# Patient Record
Sex: MALE | Race: WHITE | NOT HISPANIC OR LATINO | Employment: FULL TIME | URBAN - NONMETROPOLITAN AREA
[De-identification: names, ages, dates, MRNs, and addresses within clinical notes are randomized per-mention and may not be internally consistent; named-entity substitution may affect disease eponyms.]

---

## 2020-08-07 ENCOUNTER — HOSPITAL ENCOUNTER (EMERGENCY)
Facility: HOSPITAL | Age: 25
End: 2020-08-07
Attending: EMERGENCY MEDICINE | Admitting: EMERGENCY MEDICINE
Payer: COMMERCIAL

## 2020-08-07 ENCOUNTER — APPOINTMENT (EMERGENCY)
Dept: CT IMAGING | Facility: HOSPITAL | Age: 25
End: 2020-08-07
Payer: COMMERCIAL

## 2020-08-07 ENCOUNTER — HOSPITAL ENCOUNTER (INPATIENT)
Facility: HOSPITAL | Age: 25
LOS: 5 days | Discharge: HOME | DRG: 419 | End: 2020-08-12
Attending: INTERNAL MEDICINE | Admitting: INTERNAL MEDICINE
Payer: COMMERCIAL

## 2020-08-07 VITALS
RESPIRATION RATE: 18 BRPM | DIASTOLIC BLOOD PRESSURE: 67 MMHG | OXYGEN SATURATION: 99 % | BODY MASS INDEX: 31.89 KG/M2 | WEIGHT: 235.45 LBS | HEART RATE: 71 BPM | TEMPERATURE: 97.9 F | SYSTOLIC BLOOD PRESSURE: 121 MMHG | HEIGHT: 72 IN

## 2020-08-07 DIAGNOSIS — K80.51: Primary | ICD-10-CM

## 2020-08-07 DIAGNOSIS — R17 JAUNDICE: ICD-10-CM

## 2020-08-07 DIAGNOSIS — R17 ELEVATED BILIRUBIN: ICD-10-CM

## 2020-08-07 DIAGNOSIS — R79.89 ELEVATED LFTS: ICD-10-CM

## 2020-08-07 PROBLEM — Z72.0 TOBACCO ABUSE: Status: ACTIVE | Noted: 2020-08-07

## 2020-08-07 LAB
ALBUMIN SERPL BCP-MCNC: 4.3 G/DL (ref 3.5–5)
ALP SERPL-CCNC: 188 U/L (ref 46–116)
ALT SERPL W P-5'-P-CCNC: 545 U/L (ref 12–78)
ANION GAP SERPL CALCULATED.3IONS-SCNC: 9 MMOL/L (ref 4–13)
APTT PPP: 27 SECONDS (ref 23–37)
AST SERPL W P-5'-P-CCNC: 309 U/L (ref 5–45)
BACTERIA UR QL AUTO: ABNORMAL /HPF
BASOPHILS # BLD AUTO: 0.03 THOUSANDS/ΜL (ref 0–0.1)
BASOPHILS NFR BLD AUTO: 0 % (ref 0–1)
BILIRUB SERPL-MCNC: 4.98 MG/DL (ref 0.2–1)
BILIRUB UR QL STRIP: ABNORMAL
BUN SERPL-MCNC: 10 MG/DL (ref 5–25)
CALCIUM SERPL-MCNC: 9.4 MG/DL (ref 8.3–10.1)
CHLORIDE SERPL-SCNC: 101 MMOL/L (ref 100–108)
CK SERPL-CCNC: 75 U/L (ref 39–308)
CLARITY UR: CLEAR
CO2 SERPL-SCNC: 30 MMOL/L (ref 21–32)
COLOR UR: ABNORMAL
CREAT SERPL-MCNC: 1.13 MG/DL (ref 0.6–1.3)
EOSINOPHIL # BLD AUTO: 0.08 THOUSAND/ΜL (ref 0–0.61)
EOSINOPHIL NFR BLD AUTO: 1 % (ref 0–6)
ERYTHROCYTE [DISTWIDTH] IN BLOOD BY AUTOMATED COUNT: 12.5 % (ref 11.6–15.1)
GFR SERPL CREATININE-BSD FRML MDRD: 90 ML/MIN/1.73SQ M
GLUCOSE SERPL-MCNC: 109 MG/DL (ref 65–140)
GLUCOSE UR STRIP-MCNC: NEGATIVE MG/DL
HCT VFR BLD AUTO: 48.4 % (ref 36.5–49.3)
HGB BLD-MCNC: 16.9 G/DL (ref 12–17)
HGB UR QL STRIP.AUTO: NEGATIVE
HYALINE CASTS #/AREA URNS LPF: ABNORMAL /LPF
IMM GRANULOCYTES # BLD AUTO: 0.05 THOUSAND/UL (ref 0–0.2)
IMM GRANULOCYTES NFR BLD AUTO: 0 % (ref 0–2)
INR PPP: 0.96 (ref 0.84–1.19)
KETONES UR STRIP-MCNC: ABNORMAL MG/DL
LEUKOCYTE ESTERASE UR QL STRIP: NEGATIVE
LIPASE SERPL-CCNC: 183 U/L (ref 73–393)
LYMPHOCYTES # BLD AUTO: 1.74 THOUSANDS/ΜL (ref 0.6–4.47)
LYMPHOCYTES NFR BLD AUTO: 16 % (ref 14–44)
MCH RBC QN AUTO: 28.4 PG (ref 26.8–34.3)
MCHC RBC AUTO-ENTMCNC: 34.9 G/DL (ref 31.4–37.4)
MCV RBC AUTO: 81 FL (ref 82–98)
MONOCYTES # BLD AUTO: 0.62 THOUSAND/ΜL (ref 0.17–1.22)
MONOCYTES NFR BLD AUTO: 6 % (ref 4–12)
MUCOUS THREADS UR QL AUTO: ABNORMAL
NEUTROPHILS # BLD AUTO: 8.61 THOUSANDS/ΜL (ref 1.85–7.62)
NEUTS SEG NFR BLD AUTO: 77 % (ref 43–75)
NITRITE UR QL STRIP: NEGATIVE
NON-SQ EPI CELLS URNS QL MICRO: ABNORMAL /HPF
NRBC BLD AUTO-RTO: 0 /100 WBCS
PH UR STRIP.AUTO: 8.5 [PH]
PLATELET # BLD AUTO: 233 THOUSANDS/UL (ref 149–390)
PMV BLD AUTO: 9.1 FL (ref 8.9–12.7)
POTASSIUM SERPL-SCNC: 4.1 MMOL/L (ref 3.5–5.3)
PROT SERPL-MCNC: 7.6 G/DL (ref 6.4–8.2)
PROT UR STRIP-MCNC: ABNORMAL MG/DL
PROTHROMBIN TIME: 12.6 SECONDS (ref 11.6–14.5)
RBC # BLD AUTO: 5.96 MILLION/UL (ref 3.88–5.62)
RBC #/AREA URNS AUTO: ABNORMAL /HPF
SODIUM SERPL-SCNC: 140 MMOL/L (ref 136–145)
SP GR UR STRIP.AUTO: 1.01 (ref 1–1.03)
UROBILINOGEN UR QL STRIP.AUTO: 1 E.U./DL
WBC # BLD AUTO: 11.13 THOUSAND/UL (ref 4.31–10.16)
WBC #/AREA URNS AUTO: ABNORMAL /HPF

## 2020-08-07 PROCEDURE — 84443 ASSAY THYROID STIM HORMONE: CPT | Performed by: STUDENT IN AN ORGANIZED HEALTH CARE EDUCATION/TRAINING PROGRAM

## 2020-08-07 PROCEDURE — 85025 COMPLETE CBC W/AUTO DIFF WBC: CPT | Performed by: EMERGENCY MEDICINE

## 2020-08-07 PROCEDURE — 82550 ASSAY OF CK (CPK): CPT | Performed by: EMERGENCY MEDICINE

## 2020-08-07 PROCEDURE — 83690 ASSAY OF LIPASE: CPT | Performed by: EMERGENCY MEDICINE

## 2020-08-07 PROCEDURE — 99285 EMERGENCY DEPT VISIT HI MDM: CPT | Performed by: EMERGENCY MEDICINE

## 2020-08-07 PROCEDURE — 36415 COLL VENOUS BLD VENIPUNCTURE: CPT | Performed by: EMERGENCY MEDICINE

## 2020-08-07 PROCEDURE — G1004 CDSM NDSC: HCPCS

## 2020-08-07 PROCEDURE — 80053 COMPREHEN METABOLIC PANEL: CPT | Performed by: EMERGENCY MEDICINE

## 2020-08-07 PROCEDURE — 74176 CT ABD & PELVIS W/O CONTRAST: CPT

## 2020-08-07 PROCEDURE — 87040 BLOOD CULTURE FOR BACTERIA: CPT | Performed by: EMERGENCY MEDICINE

## 2020-08-07 PROCEDURE — 80061 LIPID PANEL: CPT | Performed by: STUDENT IN AN ORGANIZED HEALTH CARE EDUCATION/TRAINING PROGRAM

## 2020-08-07 PROCEDURE — 83036 HEMOGLOBIN GLYCOSYLATED A1C: CPT | Performed by: STUDENT IN AN ORGANIZED HEALTH CARE EDUCATION/TRAINING PROGRAM

## 2020-08-07 PROCEDURE — 96365 THER/PROPH/DIAG IV INF INIT: CPT

## 2020-08-07 PROCEDURE — 99285 EMERGENCY DEPT VISIT HI MDM: CPT

## 2020-08-07 PROCEDURE — 85730 THROMBOPLASTIN TIME PARTIAL: CPT | Performed by: EMERGENCY MEDICINE

## 2020-08-07 PROCEDURE — 85610 PROTHROMBIN TIME: CPT | Performed by: EMERGENCY MEDICINE

## 2020-08-07 PROCEDURE — 96375 TX/PRO/DX INJ NEW DRUG ADDON: CPT

## 2020-08-07 PROCEDURE — 81001 URINALYSIS AUTO W/SCOPE: CPT | Performed by: EMERGENCY MEDICINE

## 2020-08-07 PROCEDURE — 96361 HYDRATE IV INFUSION ADD-ON: CPT

## 2020-08-07 RX ORDER — KETOROLAC TROMETHAMINE 30 MG/ML
30 INJECTION, SOLUTION INTRAMUSCULAR; INTRAVENOUS EVERY 6 HOURS PRN
Status: DISCONTINUED | OUTPATIENT
Start: 2020-08-07 | End: 2020-08-08

## 2020-08-07 RX ORDER — KETOROLAC TROMETHAMINE 30 MG/ML
30 INJECTION, SOLUTION INTRAMUSCULAR; INTRAVENOUS ONCE
Status: COMPLETED | OUTPATIENT
Start: 2020-08-07 | End: 2020-08-07

## 2020-08-07 RX ORDER — ONDANSETRON 2 MG/ML
4 INJECTION INTRAMUSCULAR; INTRAVENOUS EVERY 6 HOURS PRN
Status: DISCONTINUED | OUTPATIENT
Start: 2020-08-07 | End: 2020-08-12 | Stop reason: HOSPADM

## 2020-08-07 RX ORDER — CEFTRIAXONE 1 G/50ML
1000 INJECTION, SOLUTION INTRAVENOUS ONCE
Status: COMPLETED | OUTPATIENT
Start: 2020-08-07 | End: 2020-08-07

## 2020-08-07 RX ORDER — ONDANSETRON 2 MG/ML
4 INJECTION INTRAMUSCULAR; INTRAVENOUS ONCE
Status: COMPLETED | OUTPATIENT
Start: 2020-08-07 | End: 2020-08-07

## 2020-08-07 RX ORDER — ACETAMINOPHEN 325 MG/1
975 TABLET ORAL EVERY 6 HOURS PRN
Status: DISCONTINUED | OUTPATIENT
Start: 2020-08-07 | End: 2020-08-12 | Stop reason: HOSPADM

## 2020-08-07 RX ORDER — SODIUM CHLORIDE 9 MG/ML
100 INJECTION, SOLUTION INTRAVENOUS CONTINUOUS
Status: DISCONTINUED | OUTPATIENT
Start: 2020-08-07 | End: 2020-08-08

## 2020-08-07 RX ORDER — KETOROLAC TROMETHAMINE 30 MG/ML
15 INJECTION, SOLUTION INTRAMUSCULAR; INTRAVENOUS ONCE
Status: COMPLETED | OUTPATIENT
Start: 2020-08-07 | End: 2020-08-07

## 2020-08-07 RX ADMIN — KETOROLAC TROMETHAMINE 15 MG: 30 INJECTION, SOLUTION INTRAMUSCULAR at 23:21

## 2020-08-07 RX ADMIN — SODIUM CHLORIDE 100 ML/HR: 0.9 INJECTION, SOLUTION INTRAVENOUS at 22:45

## 2020-08-07 RX ADMIN — ONDANSETRON 4 MG: 2 INJECTION INTRAMUSCULAR; INTRAVENOUS at 19:17

## 2020-08-07 RX ADMIN — SODIUM CHLORIDE 1000 ML: 0.9 INJECTION, SOLUTION INTRAVENOUS at 19:15

## 2020-08-07 RX ADMIN — KETOROLAC TROMETHAMINE 30 MG: 30 INJECTION, SOLUTION INTRAMUSCULAR at 19:17

## 2020-08-07 RX ADMIN — CEFTRIAXONE 1000 MG: 1 INJECTION, SOLUTION INTRAVENOUS at 20:28

## 2020-08-07 NOTE — LETTER
[address]  [address] [address]  Dept: [phone]    August 12, 2020     Patient: Julee Khan   YOB: 1995   Date of Visit: 8/7/2020       To Whom it May Concern:    Lamar Stout is under my professional care  He was seen in the hospital from 8/7/2020   to 08/12/20  He may return to work on 8/17/2020 with the following limitations avoid heavy lifting and increased exertion        If you have any questions or concerns, please don't hesitate to call           Sincerely,          Jodie Teixeira, DO

## 2020-08-07 NOTE — ED TRIAGE NOTES
Patient states pain comes and goes in his back  Drank pedialyte today and was vomiting after   States everything he drinks and eats he ends up vomiting back up

## 2020-08-07 NOTE — ED PROVIDER NOTES
History  Chief Complaint   Patient presents with    Back Pain     patient states he has lower back pain that started months ago when he was picking up a swing set  states he wakes up in pain  c/o dark colored urine as well     Patient is a 78-year-old male with no significant past medical or surgical history presents the emergency department complaining of lower back pain started about a month ago has been intermittent and worsening wheeze he initially injured his lower back when picking up a swing set about a month ago  Over the past 2 days the patient also noticed worsening back pain worse when he wakes up in the morning and dark coloration to the urine pain does not radiate up into the flanks no dysuria or burning with urination  Patient has also had associated nausea and vomiting no diarrhea no fevers or chills  No numbness or weakness in the legs  History provided by:  Patient and friend  Back Pain   Location:  Lumbar spine  Quality:  Aching and cramping  Chronicity:  Chronic  Associated symptoms: no abdominal pain, no chest pain, no dysuria, no fever, no headaches, no numbness and no weakness        None       History reviewed  No pertinent past medical history  History reviewed  No pertinent surgical history  History reviewed  No pertinent family history  I have reviewed and agree with the history as documented  E-Cigarette/Vaping    E-Cigarette Use Never User      E-Cigarette/Vaping Substances    Nicotine No     THC No     CBD No     Flavoring No     Other No     Unknown No      Social History     Tobacco Use    Smoking status: Current Every Day Smoker     Packs/day: 1 50     Types: Cigarettes    Smokeless tobacco: Never Used   Substance Use Topics    Alcohol use: Not Currently    Drug use: Never       Review of Systems   Constitutional: Negative for activity change, appetite change, chills, fatigue and fever     HENT: Negative for congestion, ear pain, rhinorrhea and sore throat  Eyes: Negative for discharge, redness and visual disturbance  Respiratory: Negative for cough, chest tightness, shortness of breath and wheezing  Cardiovascular: Negative for chest pain and palpitations  Gastrointestinal: Positive for nausea and vomiting  Negative for abdominal pain, constipation and diarrhea  Endocrine: Negative for polydipsia and polyuria  Genitourinary: Negative for difficulty urinating, dysuria, frequency, hematuria and urgency  Dark colored urine   Musculoskeletal: Positive for back pain  Negative for arthralgias and myalgias  Skin: Negative for color change, pallor and rash  Neurological: Negative for dizziness, weakness, light-headedness, numbness and headaches  Hematological: Negative for adenopathy  Does not bruise/bleed easily  All other systems reviewed and are negative  Physical Exam  Physical Exam  Vitals signs and nursing note reviewed  Constitutional:       Appearance: He is well-developed  HENT:      Head: Normocephalic and atraumatic  Right Ear: External ear normal       Left Ear: External ear normal       Nose: Nose normal    Eyes:      Conjunctiva/sclera: Conjunctivae normal       Pupils: Pupils are equal, round, and reactive to light  Neck:      Musculoskeletal: Normal range of motion and neck supple  Cardiovascular:      Rate and Rhythm: Normal rate and regular rhythm  Heart sounds: Normal heart sounds  Pulmonary:      Effort: Pulmonary effort is normal  No respiratory distress  Breath sounds: Normal breath sounds  No wheezing or rales  Chest:      Chest wall: No tenderness  Abdominal:      General: Bowel sounds are normal  There is no distension  Palpations: Abdomen is soft  Tenderness: There is no abdominal tenderness  There is no guarding  Musculoskeletal: Normal range of motion  Lumbar back: He exhibits tenderness, pain and spasm  Skin:     General: Skin is warm and dry     Neurological: Mental Status: He is alert and oriented to person, place, and time  Cranial Nerves: No cranial nerve deficit  Sensory: No sensory deficit           Vital Signs  ED Triage Vitals [08/07/20 1854]   Temperature Pulse Respirations Blood Pressure SpO2   (!) 97 °F (36 1 °C) 98 18 136/83 99 %      Temp Source Heart Rate Source Patient Position - Orthostatic VS BP Location FiO2 (%)   Temporal Monitor Lying Left arm --      Pain Score       8           Vitals:    08/07/20 1854 08/07/20 1930   BP: 136/83 132/78   Pulse: 98 104   Patient Position - Orthostatic VS: Lying Lying         Visual Acuity      ED Medications  Medications   cefTRIAXone (ROCEPHIN) IVPB (premix) 1,000 mg 50 mL (1,000 mg Intravenous New Bag 8/7/20 2028)   sodium chloride 0 9 % bolus 1,000 mL (1,000 mL Intravenous New Bag 8/7/20 1915)   ondansetron (ZOFRAN) injection 4 mg (4 mg Intravenous Given 8/7/20 1917)   ketorolac (TORADOL) injection 30 mg (30 mg Intravenous Given 8/7/20 1917)       Diagnostic Studies  Results Reviewed     Procedure Component Value Units Date/Time    Blood culture #1 [[id number]] Collected:  08/07/20 2027    Lab Status:  No result Specimen:  Blood from Hand, Right     Blood culture #2 [[id number]] Collected:  08/07/20 2026    Lab Status:  No result Specimen:  Blood from Arm, Left     Comprehensive metabolic panel [[id number]]  (Abnormal) Collected:  08/07/20 1915    Lab Status:  Final result Specimen:  Blood from Arm, Right Updated:  08/07/20 1953     Sodium 140 mmol/L      Potassium 4 1 mmol/L      Chloride 101 mmol/L      CO2 30 mmol/L      ANION GAP 9 mmol/L      BUN 10 mg/dL      Creatinine 1 13 mg/dL      Glucose 109 mg/dL      Calcium 9 4 mg/dL       U/L       U/L      Alkaline Phosphatase 188 U/L      Total Protein 7 6 g/dL      Albumin 4 3 g/dL      Total Bilirubin 4 98 mg/dL      eGFR 90 ml/min/1 73sq m     Narrative:       [place] guidelines for Chronic Kidney Disease (CKD):      Stage 1 with normal or high GFR (GFR > 90 mL/min/1 73 square meters)    Stage 2 Mild CKD (GFR = 60-89 mL/min/1 73 square meters)    Stage 3A Moderate CKD (GFR = 45-59 mL/min/1 73 square meters)    Stage 3B Moderate CKD (GFR = 30-44 mL/min/1 73 square meters)    Stage 4 Severe CKD (GFR = 15-29 mL/min/1 73 square meters)    Stage 5 End Stage CKD (GFR <15 mL/min/1 73 square meters)  Note: GFR calculation is accurate only with a steady state creatinine    Lipase [[id number]]  (Normal) Collected:  08/07/20 1915    Lab Status:  Final result Specimen:  Blood from Arm, Right Updated:  08/07/20 1953     Lipase 183 u/L     CK Total with Reflex CKMB [[id number]]  (Normal) Collected:  08/07/20 1915    Lab Status:  Final result Specimen:  Blood from Arm, Right Updated:  08/07/20 1952     Total CK 75 U/L     Protime-INR [[id number]]  (Normal) Collected:  08/07/20 1915    Lab Status:  Final result Specimen:  Blood from Arm, Right Updated:  08/07/20 1950     Protime 12 6 seconds      INR 0 96    APTT [[id number]]  (Normal) Collected:  08/07/20 1915    Lab Status:  Final result Specimen:  Blood from Arm, Right Updated:  08/07/20 1950     PTT 27 seconds     Urine Microscopic [[id number]]  (Abnormal) Collected:  08/07/20 1919    Lab Status:  Final result Specimen:  Urine, Clean Catch Updated:  08/07/20 1946     RBC, UA 0-1 /hpf      WBC, UA 0-1 /hpf      Epithelial Cells Occasional /hpf      Bacteria, UA Moderate /hpf      Hyaline Casts, UA 0-1 /lpf      MUCUS THREADS Occasional    UA w Reflex to Microscopic w Reflex to Culture [[id number]]  (Abnormal) Collected:  08/07/20 1919    Lab Status:  Final result Specimen:  Urine, Clean Catch Updated:  08/07/20 1929     Color, UA Izabela     Clarity, UA Clear     Specific Gravity, UA 1 015     pH, UA 8 5     Leukocytes, UA Negative     Nitrite, UA Negative     Protein, UA 30 (1+) mg/dl      Glucose, UA Negative mg/dl      Ketones, UA 15 (1+) mg/dl      Urobilinogen, UA 1 0 E U /dl Bilirubin, UA Large     Blood, UA Negative    CBC and differential [[id number]]  (Abnormal) Collected:  08/07/20 1915    Lab Status:  Final result Specimen:  Blood from Arm, Right Updated:  08/07/20 1925     WBC 11 13 Thousand/uL      RBC 5 96 Million/uL      Hemoglobin 16 9 g/dL      Hematocrit 48 4 %      MCV 81 fL      MCH 28 4 pg      MCHC 34 9 g/dL      RDW 12 5 %      MPV 9 1 fL      Platelets 318 Thousands/uL      nRBC 0 /100 WBCs      Neutrophils Relative 77 %      Immat GRANS % 0 %      Lymphocytes Relative 16 %      Monocytes Relative 6 %      Eosinophils Relative 1 %      Basophils Relative 0 %      Neutrophils Absolute 8 61 Thousands/µL      Immature Grans Absolute 0 05 Thousand/uL      Lymphocytes Absolute 1 74 Thousands/µL      Monocytes Absolute 0 62 Thousand/µL      Eosinophils Absolute 0 08 Thousand/µL      Basophils Absolute 0 03 Thousands/µL                  CT renal stone study abdomen pelvis wo contrast   Final Result by Jack Morrison MD (08/07 1940)         1  Single gallstone in the gallbladder neck without evidence of acute cholecystitis  2  Two calculi in the distal common bile duct within the head of the pancreas measuring 3 and 6 mm  Minimal dilatation of the proximal common bile duct without intrahepatic bile duct dilatation  GI consultation, ERCP and/or MRCP may be helpful for    further evaluation  3   No evidence of nephrolithiasis or obstructive uropathy  Workstation performed: [id number]                    Procedures  Procedures         ED Course  ED Course as of Aug 07 2030   Fri Aug 07, 2020   2003 Spoke with Dr Shelbie Gutierrez gastroenterology on-call reviewed case and findings in the emergency department he recommends transfer for ERCP and obtaining blood cultures and treating with IV antibiotics now          2012 Spoke with Dr Zack Camara on-call for GI reviewed case and findings in the emergency department he agrees with plan and management thus far recommends transfer for ERCP on Monday 2024 Spoke with Sharlene MERCADO internal medicine reviewed case and findings in the emergency department and GI recommendations and management thus far she accepts for transfer on behalf of Dr Irma Harris  US AUDIT      Most Recent Value   Initial Alcohol Screen: US AUDIT-C    1  How often do you have a drink containing alcohol?  0 Filed at: 08/07/2020 1857   2  How many drinks containing alcohol do you have on a typical day you are drinking? 0 Filed at: 08/07/2020 1857   3a  Male UNDER 65: How often do you have five or more drinks on one occasion? 0 Filed at: 08/07/2020 1857   3b  FEMALE Any Age, or MALE 65+: How often do you have 4 or more drinks on one occassion? 0 Filed at: 08/07/2020 1857   Audit-C Score  0 Filed at: 08/07/2020 1857                  LIBERTAD/DAST-10      Most Recent Value   How many times in the past year have you    Used an illegal drug or used a prescription medication for non-medical reasons?   Never Filed at: 08/07/2020 1921                                MDM  Number of Diagnoses or Management Options  Calculus of common bile duct with obstruction: new and requires workup  Elevated bilirubin: new and requires workup  Elevated LFTs: new and requires workup  Jaundice: new and requires workup     Amount and/or Complexity of Data Reviewed  Clinical lab tests: ordered and reviewed  Tests in the radiology section of CPT®: ordered and reviewed  Tests in the medicine section of CPT®: ordered and reviewed  Decide to obtain previous medical records or to obtain history from someone other than the patient: yes  Review and summarize past medical records: yes  Independent visualization of images, tracings, or specimens: yes    Risk of Complications, Morbidity, and/or Mortality  Presenting problems: moderate  Diagnostic procedures: moderate  Management options: moderate    Patient Progress  Patient progress: stable        Disposition  Final diagnoses:   Calculus of common bile duct with obstruction   Elevated bilirubin   Elevated LFTs   Jaundice     Time reflects when diagnosis was documented in both MDM as applicable and the Disposition within this note     Time User Action Codes Description Comment    8/7/2020  7:55 PM Ova Tyler Add [K80 51] Calculus of common bile duct with obstruction     8/7/2020  7:56 PM Ova Tyler Add [R17] Elevated bilirubin     8/7/2020  7:56 PM Ova Tyler Add [R79 89] Elevated LFTs     8/7/2020  8:04 PM Ova Tyler Add [R17] Jaundice       ED Disposition     ED Disposition Condition Date/Time Comment    Transfer to Another Facility-In Network  Fri Aug 7, 2020  8:19 PM Cyrena Masker Sites should be transferred out to B  Follow-up Information    None         Patient's Medications    No medications on file     No discharge procedures on file      PDMP Review     None          ED Provider  Electronically Signed by           Arnav Plata DO  08/07/20 2030

## 2020-08-08 PROBLEM — R71.8 MICROCYTOSIS: Status: ACTIVE | Noted: 2020-08-08

## 2020-08-08 PROBLEM — E66.9 OBESITY (BMI 30.0-34.9): Status: ACTIVE | Noted: 2020-08-08

## 2020-08-08 PROBLEM — E80.6 HYPERBILIRUBINEMIA: Status: ACTIVE | Noted: 2020-08-08

## 2020-08-08 PROBLEM — R74.01 TRANSAMINITIS: Status: ACTIVE | Noted: 2020-08-08

## 2020-08-08 PROBLEM — D72.829 LEUKOCYTOSIS: Status: ACTIVE | Noted: 2020-08-08

## 2020-08-08 LAB
ALBUMIN SERPL BCP-MCNC: 3.5 G/DL (ref 3.5–5)
ALP SERPL-CCNC: 173 U/L (ref 46–116)
ALT SERPL W P-5'-P-CCNC: 422 U/L (ref 12–78)
ANION GAP SERPL CALCULATED.3IONS-SCNC: 7 MMOL/L (ref 4–13)
AST SERPL W P-5'-P-CCNC: 219 U/L (ref 5–45)
BILIRUB SERPL-MCNC: 4.26 MG/DL (ref 0.2–1)
BUN SERPL-MCNC: 10 MG/DL (ref 5–25)
CALCIUM SERPL-MCNC: 8.8 MG/DL (ref 8.3–10.1)
CHLORIDE SERPL-SCNC: 106 MMOL/L (ref 100–108)
CHOLEST SERPL-MCNC: 198 MG/DL (ref 50–200)
CO2 SERPL-SCNC: 27 MMOL/L (ref 21–32)
CREAT SERPL-MCNC: 0.94 MG/DL (ref 0.6–1.3)
ERYTHROCYTE [DISTWIDTH] IN BLOOD BY AUTOMATED COUNT: 12.7 % (ref 11.6–15.1)
EST. AVERAGE GLUCOSE BLD GHB EST-MCNC: 114 MG/DL
GFR SERPL CREATININE-BSD FRML MDRD: 112 ML/MIN/1.73SQ M
GLUCOSE SERPL-MCNC: 112 MG/DL (ref 65–140)
HBA1C MFR BLD: 5.6 %
HBV CORE AB SER QL: NORMAL
HBV CORE IGM SER QL: NORMAL
HBV SURFACE AG SER QL: NORMAL
HCT VFR BLD AUTO: 46.7 % (ref 36.5–49.3)
HCV AB SER QL: NORMAL
HDLC SERPL-MCNC: 20 MG/DL
HGB BLD-MCNC: 15.7 G/DL (ref 12–17)
HIV 1+2 AB+HIV1 P24 AG SERPL QL IA: NORMAL
HIV1 P24 AG SER QL: NORMAL
LDLC SERPL CALC-MCNC: 123 MG/DL (ref 0–100)
MCH RBC QN AUTO: 28.4 PG (ref 26.8–34.3)
MCHC RBC AUTO-ENTMCNC: 33.6 G/DL (ref 31.4–37.4)
MCV RBC AUTO: 84 FL (ref 82–98)
PLATELET # BLD AUTO: 204 THOUSANDS/UL (ref 149–390)
PMV BLD AUTO: 9.6 FL (ref 8.9–12.7)
POTASSIUM SERPL-SCNC: 4.2 MMOL/L (ref 3.5–5.3)
PROT SERPL-MCNC: 6.6 G/DL (ref 6.4–8.2)
RBC # BLD AUTO: 5.53 MILLION/UL (ref 3.88–5.62)
SODIUM SERPL-SCNC: 140 MMOL/L (ref 136–145)
TRIGL SERPL-MCNC: 273 MG/DL
TSH SERPL DL<=0.05 MIU/L-ACNC: 0.98 UIU/ML (ref 0.36–3.74)
WBC # BLD AUTO: 10.12 THOUSAND/UL (ref 4.31–10.16)

## 2020-08-08 PROCEDURE — 86705 HEP B CORE ANTIBODY IGM: CPT | Performed by: STUDENT IN AN ORGANIZED HEALTH CARE EDUCATION/TRAINING PROGRAM

## 2020-08-08 PROCEDURE — 99254 IP/OBS CNSLTJ NEW/EST MOD 60: CPT | Performed by: SURGERY

## 2020-08-08 PROCEDURE — 85027 COMPLETE CBC AUTOMATED: CPT | Performed by: STUDENT IN AN ORGANIZED HEALTH CARE EDUCATION/TRAINING PROGRAM

## 2020-08-08 PROCEDURE — 87340 HEPATITIS B SURFACE AG IA: CPT | Performed by: STUDENT IN AN ORGANIZED HEALTH CARE EDUCATION/TRAINING PROGRAM

## 2020-08-08 PROCEDURE — NC001 PR NO CHARGE: Performed by: INTERNAL MEDICINE

## 2020-08-08 PROCEDURE — 86704 HEP B CORE ANTIBODY TOTAL: CPT | Performed by: STUDENT IN AN ORGANIZED HEALTH CARE EDUCATION/TRAINING PROGRAM

## 2020-08-08 PROCEDURE — 99222 1ST HOSP IP/OBS MODERATE 55: CPT | Performed by: INTERNAL MEDICINE

## 2020-08-08 PROCEDURE — 87806 HIV AG W/HIV1&2 ANTB W/OPTIC: CPT | Performed by: STUDENT IN AN ORGANIZED HEALTH CARE EDUCATION/TRAINING PROGRAM

## 2020-08-08 PROCEDURE — 86803 HEPATITIS C AB TEST: CPT | Performed by: STUDENT IN AN ORGANIZED HEALTH CARE EDUCATION/TRAINING PROGRAM

## 2020-08-08 PROCEDURE — 80053 COMPREHEN METABOLIC PANEL: CPT | Performed by: STUDENT IN AN ORGANIZED HEALTH CARE EDUCATION/TRAINING PROGRAM

## 2020-08-08 RX ORDER — LANOLIN ALCOHOL/MO/W.PET/CERES
3 CREAM (GRAM) TOPICAL
Status: DISCONTINUED | OUTPATIENT
Start: 2020-08-08 | End: 2020-08-12 | Stop reason: HOSPADM

## 2020-08-08 RX ORDER — OXYCODONE HYDROCHLORIDE 10 MG/1
10 TABLET ORAL EVERY 4 HOURS PRN
Status: DISCONTINUED | OUTPATIENT
Start: 2020-08-08 | End: 2020-08-12 | Stop reason: HOSPADM

## 2020-08-08 RX ORDER — OXYCODONE HYDROCHLORIDE 5 MG/1
5 TABLET ORAL EVERY 4 HOURS PRN
Status: DISCONTINUED | OUTPATIENT
Start: 2020-08-08 | End: 2020-08-12 | Stop reason: HOSPADM

## 2020-08-08 RX ORDER — DIPHENHYDRAMINE HCL 25 MG
12.5 TABLET ORAL EVERY 6 HOURS PRN
Status: DISCONTINUED | OUTPATIENT
Start: 2020-08-08 | End: 2020-08-08

## 2020-08-08 RX ORDER — SODIUM CHLORIDE, SODIUM GLUCONATE, SODIUM ACETATE, POTASSIUM CHLORIDE, MAGNESIUM CHLORIDE, SODIUM PHOSPHATE, DIBASIC, AND POTASSIUM PHOSPHATE .53; .5; .37; .037; .03; .012; .00082 G/100ML; G/100ML; G/100ML; G/100ML; G/100ML; G/100ML; G/100ML
100 INJECTION, SOLUTION INTRAVENOUS CONTINUOUS
Status: DISPENSED | OUTPATIENT
Start: 2020-08-08 | End: 2020-08-11

## 2020-08-08 RX ORDER — HYDROMORPHONE HCL/PF 1 MG/ML
1 SYRINGE (ML) INJECTION
Status: DISCONTINUED | OUTPATIENT
Start: 2020-08-08 | End: 2020-08-09

## 2020-08-08 RX ORDER — DIPHENHYDRAMINE HCL 25 MG
12.5 TABLET ORAL
Status: DISCONTINUED | OUTPATIENT
Start: 2020-08-08 | End: 2020-08-12 | Stop reason: HOSPADM

## 2020-08-08 RX ADMIN — HYDROMORPHONE HYDROCHLORIDE 1 MG: 1 INJECTION, SOLUTION INTRAMUSCULAR; INTRAVENOUS; SUBCUTANEOUS at 19:16

## 2020-08-08 RX ADMIN — METRONIDAZOLE 500 MG: 500 INJECTION, SOLUTION INTRAVENOUS at 03:35

## 2020-08-08 RX ADMIN — SODIUM CHLORIDE, SODIUM GLUCONATE, SODIUM ACETATE, POTASSIUM CHLORIDE AND MAGNESIUM CHLORIDE 100 ML/HR: 526; 502; 368; 37; 30 INJECTION, SOLUTION INTRAVENOUS at 13:36

## 2020-08-08 RX ADMIN — OXYCODONE HYDROCHLORIDE 10 MG: 10 TABLET ORAL at 12:26

## 2020-08-08 RX ADMIN — METRONIDAZOLE 500 MG: 500 INJECTION, SOLUTION INTRAVENOUS at 19:16

## 2020-08-08 RX ADMIN — HYDROMORPHONE HYDROCHLORIDE 1 MG: 1 INJECTION, SOLUTION INTRAMUSCULAR; INTRAVENOUS; SUBCUTANEOUS at 13:36

## 2020-08-08 RX ADMIN — SODIUM CHLORIDE, SODIUM GLUCONATE, SODIUM ACETATE, POTASSIUM CHLORIDE AND MAGNESIUM CHLORIDE 100 ML/HR: 526; 502; 368; 37; 30 INJECTION, SOLUTION INTRAVENOUS at 03:30

## 2020-08-08 RX ADMIN — DIPHENHYDRAMINE HCL 12.5 MG: 25 TABLET ORAL at 00:20

## 2020-08-08 RX ADMIN — CEFTRIAXONE SODIUM 2000 MG: 2 INJECTION, POWDER, FOR SOLUTION INTRAMUSCULAR; INTRAVENOUS at 05:38

## 2020-08-08 RX ADMIN — ONDANSETRON 4 MG: 2 INJECTION INTRAMUSCULAR; INTRAVENOUS at 13:36

## 2020-08-08 RX ADMIN — ONDANSETRON 4 MG: 2 INJECTION INTRAMUSCULAR; INTRAVENOUS at 00:36

## 2020-08-08 RX ADMIN — ONDANSETRON 4 MG: 2 INJECTION INTRAMUSCULAR; INTRAVENOUS at 19:16

## 2020-08-08 RX ADMIN — MELATONIN 3 MG: at 00:20

## 2020-08-08 RX ADMIN — METRONIDAZOLE 500 MG: 500 INJECTION, SOLUTION INTRAVENOUS at 11:36

## 2020-08-08 NOTE — PROGRESS NOTES
INTERNAL MEDICINE RESIDENCY PROGRESS NOTE     Name: Irina Devries [facility]   Age & Sex: 22 y o  male   MRN: [id number]  Unit/Bed#: -01   Encounter: [id number]  Team: SOD Team A    PATIENT INFORMATION     Name: Irina Devries [facility]   Age & Sex: 22 y o  male   MRN: [id number]  Hospital Stay Days: 1    ASSESSMENT/PLAN     Principal Problem:    Calculus of common bile duct with obstruction  Active Problems:    Tobacco abuse    Obesity (BMI 30 0-34  9)    Transaminitis    Leukocytosis    Hyperbilirubinemia    Microcytosis      Hyperbilirubinemia  Assessment & Plan  Tbili 4 98, jaundice on arrival    Plan:  -continue to trend    Leukocytosis  Assessment & Plan  Mild leukocytosis on arrival, WBC 11 13    Plan:  -f/u am CBC    Transaminitis  Assessment & Plan    on arrival     Plan:  -likely 2/2 cholestasis  -Trend CMP, HIV negative  Hep panel pending    Obesity (BMI 30 0-34  9)  Assessment & Plan  -educated on lifestyle modification/weight loss  -f/u A1c, lipid panel, TSH    Tobacco abuse  Assessment & Plan  Current smoker, 1 5 ppd  Plan:  -smoking cessation counseling, nicotine patch deferred    * Calculus of common bile duct with obstruction  Assessment & Plan  1 month hx of LBP that has become constant in past 1-2 days, now with worsening RUQ/epigastric pain w/ dark urine/jaundice/NBNB emesis  AVSS on arrival  , , Alk-phos 188, Tbili 4 98, WBC 11 13  CT in ED demonstrated stone in gallbladder neck w/o acute cholecystitis, 2 calculi in distal CBD within head of pancreas w/o intrahepatic ductal dilatation  Transfer from [facility] to [facility] for likely ERCP, GI eval      Plan:  -Cont  Empiric IV ceftriaxone, flagyl  -trend LFTs  -trend WBC  -PRN toradol for pain control  -PRN zofran  -IVF   -GI consulted, appreciate recs, likely decompressive ERCP on Monday,         Disposition:  Pending ERCP on Monday    SUBJECTIVE     Patient seen and examined  No acute events overnight    Pain is currently controlled, denies any nausea, vomiting  Rest of ROS was negative  OBJECTIVE     Vitals:    20 2217 20 0324 20 0742   BP: 140/85  113/67   BP Location: Right arm     Pulse: 85  74   Resp: 18     Temp: 98 5 °F (36 9 °C)  (!) 97 4 °F (36 3 °C)   TempSrc: Oral     SpO2: 98% 95% 97%   Weight: 105 kg (232 lb 9 4 oz)     Height: 6' (1 829 m)        Temperature:   Temp (24hrs), Av 7 °F (36 5 °C), Min:97 °F (36 1 °C), Max:98 5 °F (36 9 °C)    Temperature: (!) 97 4 °F (36 3 °C)  Intake & Output:  I/O        07 -  0700  07 -  07 07 -  0700    P  O    0    Total Intake(mL/kg)   0 (0)    Urine (mL/kg/hr)   500 (2 3)    Total Output   500    Net   -500               Weights:   IBW: 77 6 kg    Body mass index is 31 54 kg/m²  Weight (last 2 days)     Date/Time   Weight    207   105 (232 59)            Physical Exam  Constitutional:       Appearance: Normal appearance  HENT:      Head: Normocephalic and atraumatic  Mouth/Throat:      Mouth: Mucous membranes are moist    Eyes:      General: Scleral icterus present  Extraocular Movements: Extraocular movements intact  Conjunctiva/sclera: Conjunctivae normal       Pupils: Pupils are equal, round, and reactive to light  Cardiovascular:      Rate and Rhythm: Normal rate and regular rhythm  Pulses: Normal pulses  Heart sounds: Normal heart sounds  Pulmonary:      Effort: Pulmonary effort is normal       Breath sounds: Normal breath sounds  Abdominal:      General: Abdomen is flat  Bowel sounds are normal  There is no distension  Palpations: Abdomen is soft  Tenderness: There is abdominal tenderness  Comments: Mild tenderness to the right upper quadrant, Calderon sign negative   Musculoskeletal:      Right lower leg: No edema  Left lower leg: No edema  Skin:     General: Skin is warm and dry  Neurological:      General: No focal deficit present        Mental Status: He is alert and oriented to person, place, and time  Psychiatric:         Mood and Affect: Mood normal          Behavior: Behavior normal        LABORATORY DATA     Labs: I have personally reviewed pertinent reports  Results from last 7 days   Lab Units 08/08/20  0544 08/07/20 1915   WBC Thousand/uL 10 12 11 13*   HEMOGLOBIN g/dL 15 7 16 9   HEMATOCRIT % 46 7 48 4   PLATELETS Thousands/uL 204 233   NEUTROS PCT %  --  77*   MONOS PCT %  --  6      Results from last 7 days   Lab Units 08/08/20  0543 08/07/20 1915   POTASSIUM mmol/L 4 2 4 1   CHLORIDE mmol/L 106 101   CO2 mmol/L 27 30   BUN mg/dL 10 10   CREATININE mg/dL 0 94 1 13   CALCIUM mg/dL 8 8 9 4   ALK PHOS U/L 173* 188*   ALT U/L 422* 545*   AST U/L 219* 309*              Results from last 7 days   Lab Units 08/07/20 1915   INR  0 96   PTT seconds 27               IMAGING & DIAGNOSTIC TESTING     Radiology Results: I have personally reviewed pertinent reports  Ct Renal Stone Study Abdomen Pelvis Wo Contrast    Result Date: 8/7/2020  Impression: 1  Single gallstone in the gallbladder neck without evidence of acute cholecystitis  2  Two calculi in the distal common bile duct within the head of the pancreas measuring 3 and 6 mm  Minimal dilatation of the proximal common bile duct without intrahepatic bile duct dilatation  GI consultation, ERCP and/or MRCP may be helpful for further evaluation  3   No evidence of nephrolithiasis or obstructive uropathy  Workstation performed: [id number]     Other Diagnostic Testing: I have personally reviewed pertinent reports      ACTIVE MEDICATIONS     Current Facility-Administered Medications   Medication Dose Route Frequency    acetaminophen (TYLENOL) tablet 975 mg  975 mg Oral Q6H PRN    cefTRIAXone (ROCEPHIN) 2,000 mg in dextrose 5 % 50 mL IVPB  2,000 mg Intravenous Q24H    diphenhydrAMINE (BENADRYL) tablet 12 5 mg  12 5 mg Oral HS PRN    HYDROmorphone (DILAUDID) injection 1 mg  1 mg Intravenous Q3H PRN    melatonin tablet 3 mg  3 mg Oral HS    metroNIDAZOLE (FLAGYL) IVPB (premix) 500 mg 100 mL  500 mg Intravenous Q8H    multi-electrolyte (PLASMALYTE-A/ISOLYTE-S PH 7 4) IV solution  100 mL/hr Intravenous Continuous    ondansetron (ZOFRAN) injection 4 mg  4 mg Intravenous Q6H PRN    oxyCODONE (ROXICODONE) immediate release tablet 10 mg  10 mg Oral Q4H PRN    oxyCODONE (ROXICODONE) IR tablet 5 mg  5 mg Oral Q4H PRN       VTE Pharmacologic Prophylaxis: Reason for no pharmacologic prophylaxis low risk  VTE Mechanical Prophylaxis: reason for no mechanical VTE prophylaxis low risk    Portions of the record may have been created with voice recognition software  Occasional wrong word or "sound a like" substitutions may have occurred due to the inherent limitations of voice recognition software    Read the chart carefully and recognize, using context, where substitutions have occurred   ==  Annamaria Primrose, [facility]  Internal Medicine Residency PGY-2

## 2020-08-08 NOTE — PLAN OF CARE
Problem: INFECTION - ADULT  Goal: Absence or prevention of progression during hospitalization  Description: INTERVENTIONS:  - Assess and monitor for signs and symptoms of infection  - Monitor lab/diagnostic results  - Monitor all insertion sites, i e  indwelling lines, tubes, and drains  - Monitor endotracheal if appropriate and nasal secretions for changes in amount and color  - [place] appropriate cooling/warming therapies per order  - Administer medications as ordered  - Instruct and encourage patient and family to use good hand hygiene technique  - Identify and instruct in appropriate isolation precautions for identified infection/condition  Outcome: Progressing  Goal: Absence of fever/infection during neutropenic period  Description: INTERVENTIONS:  - Monitor WBC    Outcome: Progressing     Problem: METABOLIC, FLUID AND ELECTROLYTES - ADULT  Goal: Electrolytes maintained within normal limits  Description: INTERVENTIONS:  - Monitor labs and assess patient for signs and symptoms of electrolyte imbalances  - Administer electrolyte replacement as ordered  - Monitor response to electrolyte replacements, including repeat lab results as appropriate  - Instruct patient on fluid and nutrition as appropriate  Outcome: Progressing  Goal: Fluid balance maintained  Description: INTERVENTIONS:  - Monitor labs   - Monitor I/O and WT  - Instruct patient on fluid and nutrition as appropriate  - Assess for signs & symptoms of volume excess or deficit  Outcome: Progressing  Goal: Glucose maintained within target range  Description: INTERVENTIONS:  - Monitor Blood Glucose as ordered  - Assess for signs and symptoms of hyperglycemia and hypoglycemia  - Administer ordered medications to maintain glucose within target range  - Assess nutritional intake and initiate nutrition service referral as needed  Outcome: Progressing

## 2020-08-08 NOTE — PROGRESS NOTES
INTERNAL MEDICINE RESIDENCY  SENIOR ADMISSION NOTE     Unit/Bed#: -01   Encounter: [id number]  SOD Team A    PATIENT INFORMATION     Reagan Sites   22 y o  male   MRN: [id number]  Hospital Stay Days: 1    HISTORY OF PRESENT ILLNESS     Pt is a 22yo M with no PMH except for tobacco dependence who presented as a transfer from [facility] after being found to have Common Bile duct calculous w/ obstruction  ERCP to follow  Reviewed H&P per Dr Lucas Ospina  Agree with the assessment and plan except as noted below  ASSESSMENT/PLAN     Principal Problem:    Calculus of common bile duct with obstruction  Active Problems:    Tobacco abuse    Obesity (BMI 30 0-34  9)    Transaminitis    Leukocytosis    Hyperbilirubinemia     1  Obstructive common bile duct 2/2 calculous- CT A/P showing 3mm and 6mm obstructing stone in common bile duct  VS stable  A &O x4  GI made aware  Pain control (limiting narcotics), IVF, NPO, GI consult for ERCP, blood cultures X2 to follow, IV abx with Rocephin and Flagyl     2  Transaminitis- hepatocellular pattern; likely 2/2 to problem #1  ALT > AST > Alk Phos  Gentle IVF, monitor CMP, f/u HIV and chronic hep panel    3  Hyperbilirubinemia- Bili 4 98; obv Jaundiced, 2/2 problem #1  Monitor CMP    4  Tobacco abuse- smokes 1 5 packs/ daily  States he is ready to quit  Educated on smoking cessation  Can provide nicoderm     5  Obesity- follow up A1c , lipid panel, TSH  Educated on lifestyle modifications/ weight loss    Management of remaining chronic medical issues as detailed in H&P by Dr Lucas Ospina      Code Status: FULL  Diet: NPO  VTE Prophylaxis: Ambulate    Disposition: INPATIENT   Expected LOS: >2 Midnights    ==  Prem Reyes MD  Resident, PGY-2  [facility] Internal [facility] Res[facility]

## 2020-08-08 NOTE — CONSULTS
Consultation - [address] [facility]  [facility] Sites 22 y o  male MRN: [id number]  Unit/Bed#: -01 Encounter: [id number]              Inpatient consult to gastroenterology     Performed by  Clovia Cabot, MD     Authorized by Zee Treadwell MD              Reason for Consult / Principal Problem:     Choledocholithiasis      ASSESSMENT AND PLAN:      Choledocholithiasis  26-year-old male patient presenting with abdominal pain, choluria and acholia  CT of the abdomen was performed on admission and found choledocholithiasis with bile duct dilation,   Currently bilirubin 4 2 from 4 9 on admission  Afebrile with normal white count, no signs of cholangitis  Will perform ERCP on Monday  Need surgery consult to consider cholecystectomy after ERCP    Hepatic steatosis  Hepatic steatosis was found on CT of the abdomen  Currently mildly elevated LFTs in the setting of choledocholithiasis, improving  Needs outpatient follow-up  ______________________________________________________________________    HPI:  Patient seen and examined at the bed, his 26-year-old male patient that presented to [facility] complaining about abdominal pain, choluria and acholia, during admission the patient was found to have choledocholithiasis along with hyperbilirubinemia and was transferred to [facility] to consider ERCP, he denies any events overnight, currently is tolerating PO route, denies nausea or vomiting, is passing flatus and having bowel movements, denies chest pain or shortness of breath, no abdominal pain, patient is ambulating       REVIEW OF SYSTEMS:    CONSTITUTIONAL: Denies any fever, chills, rigors, and weight loss  HEENT: No earache or tinnitus  Denies hearing loss or visual disturbances  CARDIOVASCULAR: No chest pain or palpitations  RESPIRATORY: Denies any cough, hemoptysis, shortness of breath or dyspnea on exertion  GASTROINTESTINAL: As noted in the History of Present Illness  GENITOURINARY: No problems with urination  Denies any hematuria or dysuria  NEUROLOGIC: No dizziness or vertigo, denies headaches  MUSCULOSKELETAL: Denies any muscle or joint pain  SKIN: Denies skin rashes or itching  ENDOCRINE: Denies excessive thirst  Denies intolerance to heat or cold  PSYCHOSOCIAL: Denies depression or anxiety  Denies any recent memory loss  Historical Information   No past medical history on file  No past surgical history on file  Social History   Social History     Substance and Sexual Activity   Alcohol Use Not Currently     Social History     Substance and Sexual Activity   Drug Use Never     Social History     Tobacco Use   Smoking Status Current Every Day Smoker    Packs/day: 1 50    Types: Cigarettes   Smokeless Tobacco Never Used     No family history on file  Meds/Allergies     No medications prior to admission  Current Facility-Administered Medications   Medication Dose Route Frequency    acetaminophen (TYLENOL) tablet 975 mg  975 mg Oral Q6H PRN    cefTRIAXone (ROCEPHIN) 2,000 mg in dextrose 5 % 50 mL IVPB  2,000 mg Intravenous Q24H    diphenhydrAMINE (BENADRYL) tablet 12 5 mg  12 5 mg Oral HS PRN    HYDROmorphone (DILAUDID) injection 1 mg  1 mg Intravenous Q3H PRN    melatonin tablet 3 mg  3 mg Oral HS    metroNIDAZOLE (FLAGYL) IVPB (premix) 500 mg 100 mL  500 mg Intravenous Q8H    multi-electrolyte (PLASMALYTE-A/ISOLYTE-S PH 7 4) IV solution  100 mL/hr Intravenous Continuous    ondansetron (ZOFRAN) injection 4 mg  4 mg Intravenous Q6H PRN    oxyCODONE (ROXICODONE) immediate release tablet 10 mg  10 mg Oral Q4H PRN    oxyCODONE (ROXICODONE) IR tablet 5 mg  5 mg Oral Q4H PRN       No Known Allergies        Objective     Blood pressure 113/67, pulse 74, temperature (!) 97 4 °F (36 3 °C), resp  rate 18, height 6' (1 829 m), weight 105 kg (232 lb 9 4 oz), SpO2 97 %  Body mass index is 31 54 kg/m²        Intake/Output Summary (Last 24 hours) at 8/8/2020 [address] filed at 8/8/2020 0835  Gross per 24 hour   Intake 0 ml   Output 500 ml   Net -500 ml         PHYSICAL EXAM:      General Appearance:   Alert, cooperative, no distress   HEENT:   Normocephalic, atraumatic  Neck:  Supple, symmetrical, trachea midline   Lungs:   Clear to auscultation bilaterally; no rales, rhonchi or wheezing; respirations unlabored    Heart[de-identified]   Regular rate and rhythm; no murmur, rub, or gallop     Abdomen:   Soft, non-tender, non-distended; normal bowel sounds; no organomegaly    Genitalia:   Deferred    Rectal:   Deferred    Extremities:  No cyanosis, clubbing or edema    Skin:  No rashes, or lesions    Lymph nodes:  No palpable cervical lymphadenopathy          Lab Results:   Admission on 08/07/2020   Component Date Value    Hepatitis B Surface Ag 08/08/2020 Non-reactive     Hepatitis C Ab 08/08/2020 Non-reactive     Hep B C IgM 08/08/2020 Non-reactive     Hep B Core Total Ab 08/08/2020 Non-reactive     Rapid HIV 1 AND 2 08/08/2020 Non-Reactive     HIV-1 P24 Ag Screen 08/08/2020 Non-Reactive     Cholesterol 08/07/2020 198     Triglycerides 08/07/2020 273*    HDL, Direct 08/07/2020 20*    LDL Calculated 08/07/2020 123*    TSH 3RD GENERATON 08/07/2020 0 980     WBC 08/08/2020 10 12     RBC 08/08/2020 5 53     Hemoglobin 08/08/2020 15 7     Hematocrit 08/08/2020 46 7     MCV 08/08/2020 84     MCH 08/08/2020 28 4     MCHC 08/08/2020 33 6     RDW 08/08/2020 12 7     Platelets 19/25/3088 204     MPV 08/08/2020 9 6     Sodium 08/08/2020 140     Potassium 08/08/2020 4 2     Chloride 08/08/2020 106     CO2 08/08/2020 27     ANION GAP 08/08/2020 7     BUN 08/08/2020 10     Creatinine 08/08/2020 0 94     Glucose 08/08/2020 112     Calcium 08/08/2020 8 8     AST 08/08/2020 219*    ALT 08/08/2020 422*    Alkaline Phosphatase 08/08/2020 173*    Total Protein 08/08/2020 6 6     Albumin 08/08/2020 3 5     Total Bilirubin 08/08/2020 4 26*    eGFR 08/08/2020 112        Imaging Studies: I have personally reviewed pertinent imaging studies

## 2020-08-08 NOTE — ASSESSMENT & PLAN NOTE
1 month hx of LBP that has become constant in past 1-2 days, now with worsening RUQ/epigastric pain w/ dark urine/jaundice/NBNB emesis  AVSS on arrival  , , Alk-phos 188, Tbili 4 98, WBC 11 13  CT in ED demonstrated stone in gallbladder neck w/o acute cholecystitis, 2 calculi in distal CBD within head of pancreas w/o intrahepatic ductal dilatation  [facility] for likely ERCP, GI eval      Plan:  -Cont   Empiric IV ceftriaxone, flagyl  -trend LFTs  -trend WBC  -PRN toradol for pain control  -PRN zofran  -IVF   -GI consulted, appreciate recs, likely decompressive ERCP on Monday,

## 2020-08-08 NOTE — H&P
INTERNAL MEDICINE RESIDENCY ADMISSION H&P     Name: Moe Stout   Age & Sex: 22 y o  male   MRN: [id number]  Unit/Bed#: -Kristine   Encounter: [id number]  Primary Care Provider: No primary care provider on file  Code Status: Level 1 - Full Code  Admission Status: INPATIENT   Disposition: Patient requires Med/Surg    Admit to team: SOD Team A    ASSESSMENT/PLAN     Principal Problem:    Calculus of common bile duct with obstruction  Active Problems:    Tobacco abuse    Tobacco abuse  Assessment & Plan  Current smoker, 1 5 ppd  Plan:  -smoking cessation counseling, nicotine patch deferred    * Calculus of common bile duct with obstruction  Assessment & Plan  1 month hx of LBP that has become constant in past 1-2 days, now with worsening RUQ/epigastric pain w/ dark urine/jaundice/NBNB emesis  AVSS on arrival  , , Alk-phos 188, Tbili 4 98, WBC 11 13  CT in ED demonstrated stone in gallbladder neck w/o acute cholecystitis, 2 calculi in distal CBD within head of pancreas w/o intrahepatic ductal dilatation  [facility] for likely ERCP, GI eval      Plan:  -Cont  Empiric IV ceftriaxone 2g   -trend LFTs  -trend WBC  -AVSS currently  -NPO @ mn  -PRN toradol for pain control  -PRN zofran  -IVF   -GI consulted, appreciate recs, likely decompressive ERCP      VTE Pharmacologic Prophylaxis: Reason for no pharmacologic prophylaxis low risk  VTE Mechanical Prophylaxis: sequential compression device    CHIEF COMPLAINT     Back pain    HISTORY OF PRESENT ILLNESS     Moe Stout is a 30-year-old male with no significant known past medical or surgical history that presented to [facility] with complaints of lower back pain that had started about 1 month ago  Two days prior to arrival he noticed worsening back and epigastric pain in the morning and dark urine  Back pain was intermittent initially and has become constant in past 1-2 days   He endorsed accompanying nausea and NBNB vomiting with decreased PO intake  Denied flank pain, dysuria, fever, chills, numbnes/weakness  Decreased PO intake, able to keep sandwich down earlier in day  Tobacco user, denies alcohol or recreational drug abuse  FH significant for gallbladder surgery in dad and paternal grandmother, diabetes in "everyone"  On arrival to [facility] he was afebrile, borderline tachycardic, and otherwise stable  Blood cultures were collected and he was started on IV ceftriaxone  He had elevated LFTs: , , alk-phos 188, total bilirubin 4 98   1+ protein, 1+ ketones on UA, mild leukocytosis of 11 13   CT demonstrated single gallstone in gallbladder neck without evidence of acute cholecystitis, 2 calculi distal CBD within head of pancreas measuring 3 and 6 mm, minimal dilatation of proximal CBD without intrahepatic bile duct dilatation  No nephrolithiasis  He is transferred to [facility] for GI evaluation, likely decompressive ERCP  REVIEW OF SYSTEMS     Review of Systems   Constitutional: Positive for appetite change  Negative for chills, diaphoresis, fatigue and fever  Respiratory: Negative for apnea, cough, choking, chest tightness and shortness of breath  Cardiovascular: Negative for chest pain, palpitations and leg swelling  Gastrointestinal: Positive for abdominal pain, nausea and vomiting  Negative for abdominal distention, blood in stool, constipation and diarrhea  Genitourinary: Negative for dysuria and flank pain  Musculoskeletal: Positive for back pain  Neurological: Negative for weakness and headaches  All other systems reviewed and are negative      OBJECTIVE     Vitals:    20 2217   BP: 140/85   BP Location: Right arm   Pulse: 85   Resp: 18   Temp: 98 5 °F (36 9 °C)   TempSrc: Oral   SpO2: 98%   Weight: 105 kg (232 lb 9 4 oz)   Height: 6' (1 829 m)      Temperature:   Temp (24hrs), Av 8 °F (36 6 °C), Min:97 °F (36 1 °C), Max:98 5 °F (36 9 °C)    Temperature: 98 5 °F (36 9 °C)  Intake & Output:  I/O     None        Weights:   IBW: 77 6 kg    Body mass index is 31 54 kg/m²  Weight (last 2 days)     Date/Time   Weight    08/07/20 2217   105 (232 59)            Physical Exam  Vitals signs reviewed  Constitutional:       Appearance: He is not ill-appearing or diaphoretic  HENT:      Head: Normocephalic and atraumatic  Mouth/Throat:      Mouth: Mucous membranes are moist       Pharynx: Oropharynx is clear  Eyes:      General: Scleral icterus present  Extraocular Movements: Extraocular movements intact  Conjunctiva/sclera: Conjunctivae normal       Pupils: Pupils are equal, round, and reactive to light  Neck:      Musculoskeletal: Normal range of motion and neck supple  Cardiovascular:      Rate and Rhythm: Normal rate and regular rhythm  Pulses: Normal pulses  Heart sounds: No murmur  No friction rub  No gallop  Pulmonary:      Effort: Pulmonary effort is normal       Breath sounds: Normal breath sounds  Abdominal:      General: Bowel sounds are normal  There is no distension  Palpations: There is no mass  Tenderness: There is abdominal tenderness  There is no guarding or rebound  Comments: RLQ/RUQ/epigastric TTP, no bruising, negative Calderon's   Musculoskeletal: Normal range of motion  General: No swelling  Skin:     General: Skin is warm and dry  Coloration: Skin is jaundiced  Neurological:      General: No focal deficit present  Mental Status: He is alert and oriented to person, place, and time  PAST MEDICAL HISTORY   No past medical history on file  PAST SURGICAL HISTORY   No past surgical history on file    SOCIAL & FAMILY HISTORY     Social History     Substance and Sexual Activity   Alcohol Use Not Currently     Social History     Substance and Sexual Activity   Drug Use Never     Social History     Tobacco Use   Smoking Status Current Every Day Smoker    Packs/day: 1 50    Types: Cigarettes   Smokeless Tobacco Never Used     No family history on file  LABORATORY DATA     Labs: I have personally reviewed pertinent reports  Results from last 7 days   Lab Units 08/07/20 1915   WBC Thousand/uL 11 13*   HEMOGLOBIN g/dL 16 9   HEMATOCRIT % 48 4   PLATELETS Thousands/uL 233   NEUTROS PCT % 77*   MONOS PCT % 6      Results from last 7 days   Lab Units 08/07/20 1915   POTASSIUM mmol/L 4 1   CHLORIDE mmol/L 101   CO2 mmol/L 30   BUN mg/dL 10   CREATININE mg/dL 1 13   CALCIUM mg/dL 9 4   ALK PHOS U/L 188*   ALT U/L 545*   AST U/L 309*              Results from last 7 days   Lab Units 08/07/20 1915   INR  0 96   PTT seconds 27             Micro:  No results found for: BLOODCX, Marigene Baston, WOUNDCULT, SPUTUMCULTUR  IMAGING & DIAGNOSTIC TESTS     Imaging: I have personally reviewed pertinent reports  Ct Renal Stone Study Abdomen Pelvis Wo Contrast    Result Date: 8/7/2020  Impression: 1  Single gallstone in the gallbladder neck without evidence of acute cholecystitis  2  Two calculi in the distal common bile duct within the head of the pancreas measuring 3 and 6 mm  Minimal dilatation of the proximal common bile duct without intrahepatic bile duct dilatation  GI consultation, ERCP and/or MRCP may be helpful for further evaluation  3   No evidence of nephrolithiasis or obstructive uropathy  Workstation performed: [id number]     EKG, Pathology, and Other Studies: I have personally reviewed pertinent reports  ALLERGIES   No Known Allergies  MEDICATIONS PRIOR TO ARRIVAL     Prior to Admission medications    Not on File     MEDICATIONS ADMINISTERED IN LAST 24 HOURS     CURRENT MEDICATIONS     sodium chloride, 100 mL/hr, Last Rate: 100 mL/hr (08/07/20 2245)          Admission Time  I spent 30 minutes admitting the patient    This involved direct patient contact where I performed a full history and physical, reviewing previous records, and reviewing laboratory and other diagnostic studies  Portions of the record may have been created with voice recognition software  Occasional wrong word or "sound a like" substitutions may have occurred due to the inherent limitations of voice recognition software    Read the chart carefully and recognize, using context, where substitutions have occurred     ==  Sheyla Perez MD, PGY-I  [facility] ([place], [place])

## 2020-08-08 NOTE — EMTALA/ACUTE CARE TRANSFER
[address]  [facility] [address]  Dept: [phone]      EMTALA TRANSFER CONSENT    NAME Candie Stout                                         1995                              MRN [id number]    I have been informed of my rights regarding examination, treatment, and transfer   by Dr Judeen Lesches, DO    Benefits: Specialized equipment and/or services available at the receiving facility (Include comment)________________________(ERCP)    Risks: Potential for delay in receiving treatment, Potential deterioration of medical condition, Loss of IV, Increased discomfort during transfer, Possible worsening of condition or death during transfer      Consent for Transfer:  I acknowledge that my medical condition has been evaluated and explained to me by the emergency department physician or other qualified medical person and/or my attending physician, who has recommended that I be transferred to the service of  Accepting Physician: Dr Tuyet Cohn at [address] Name, [address] : [facility]  The above potential benefits of such transfer, the potential risks associated with such transfer, and the probable risks of not being transferred have been explained to me, and I fully understand them  The doctor has explained that, in my case, the benefits of transfer outweigh the risks  I agree to be transferred  I authorize the performance of emergency medical procedures and treatments upon me in both transit and upon arrival at the receiving facility  Additionally, I authorize the release of any and all medical records to the receiving facility and request they be transported with me, if possible  I understand that the safest mode of transportation during a medical emergency is an ambulance and that the Hospital advocates the use of this mode of transport   Risks of traveling to the receiving facility by car, including absence of medical control, life sustaining equipment, such as oxygen, and medical personnel has been explained to me and I fully understand them  (AILEEN CORRECT BOX BELOW)  [  ]  I consent to the stated transfer and to be transported by ambulance/helicopter  [  ]  I consent to the stated transfer, but refuse transportation by ambulance and accept full responsibility for my transportation by car  I understand the risks of non-ambulance transfers and I exonerate the Hospital and its staff from any deterioration in my condition that results from this refusal     X___________________________________________    DATE  20  TIME________  Signature of patient or legally responsible individual signing on patient behalf           RELATIONSHIP TO PATIENT_________________________          Provider Certification    NAME [facility]                                         1995                              MRN [id number]    A medical screening exam was performed on the above named patient  Based on the examination:    Condition Necessitating Transfer The primary encounter diagnosis was Calculus of common bile duct with obstruction  Diagnoses of Elevated bilirubin, Elevated LFTs, and Jaundice were also pertinent to this visit      Patient Condition: The patient has been stabilized such that within reasonable medical probability, no material deterioration of the patient condition or the condition of the unborn child(ольга) is likely to result from the transfer    Reason for Transfer:      Transfer Requirements: Facility [facility]   · Space available and qualified personnel available for treatment as acknowledged by    · Agreed to accept transfer and to provide appropriate medical treatment as acknowledged by       Dr Lilian Essex  · Appropriate medical records of the examination and treatment of the patient are provided at the time of transfer   [address] _______  · Transfer will be performed by qualified personnel from [address] [facility]  and appropriate transfer equipment as required, including the use of necessary and appropriate life support measures  Provider Certification: I have examined the patient and explained the following risks and benefits of being transferred/refusing transfer to the patient/family:  General risk, such as traffic hazards, adverse weather conditions, rough terrain or turbulence, possible failure of equipment (including vehicle or aircraft), or consequences of actions of persons outside the control of the transport personnel, Unanticipated needs of medical equipment and personnel during transport, The possibility of a transport vehicle being unavailable, Risk of worsening condition      Based on these reasonable risks and benefits to the patient and/or the unborn child(ольга), and based upon the information available at the time of the patients examination, I certify that the medical benefits reasonably to be expected from the provision of appropriate medical treatments at another medical facility outweigh the increasing risks, if any, to the individuals medical condition, and in the case of labor to the unborn child, from effecting the transfer      X____________________________________________ DATE 08/07/20        TIME_______      ORIGINAL - SEND TO MEDICAL RECORDS   COPY - SEND WITH PATIENT DURING TRANSFER

## 2020-08-08 NOTE — CONSULTS
Consultation - General Surgery  [facility] 22 y o  male MRN: [id number]  Unit/Bed#: -01 Encounter: [id number]        Assessment/Plan     Assessment:  22 M with choledocholithiasis  Plan:  Clears  IV ABX  IVF  ERCP Monday   Care per primary team    History of Present Illness     HPI:  Fernando Stout is a 22 y o  male who presents to [place] [address] 1 month abdominal pain with radiation to his back and worse at night  This pain has been worsening in past 2 days  Additionally he has had dark urine, light stools, jaundice, and nausea/vomiting  Imaging at outside facility showed a common bile duct stone  He was transferred to [facility] for GI eval and possible ERCP  General Surgery was consulted for possible cholecystectomy post ERCP    Review of Systems   Constitutional: Positive for fatigue and fever  HENT: Negative  Eyes: Negative  Respiratory: Negative for shortness of breath  Cardiovascular: Negative for chest pain  Gastrointestinal: Positive for abdominal distention, abdominal pain, diarrhea, nausea and vomiting  Negative for blood in stool  Endocrine: Negative  Genitourinary:        Darkening urine   Musculoskeletal: Positive for back pain  Negative for arthralgias  Allergic/Immunologic: Negative  Neurological: Negative  Hematological: Negative for adenopathy  Psychiatric/Behavioral: Negative for behavioral problems and confusion  Historical Information   No past medical history on file  No past surgical history on file  Social History   Social History     Substance and Sexual Activity   Alcohol Use Not Currently     Social History     Substance and Sexual Activity   Drug Use Never     Social History     Tobacco Use   Smoking Status Current Every Day Smoker    Packs/day: 1 50    Types: Cigarettes   Smokeless Tobacco Never Used     Family History: No family history on file      Meds/Allergies   PTA meds:   None     No Known Allergies    Objective First Vitals:   Blood Pressure: 140/85 (08/07/20 2217)  Pulse: 85 (08/07/20 2217)  Temperature: 98 5 °F (36 9 °C) (08/07/20 2217)  Temp Source: Oral (08/07/20 2217)  Respirations: 18 (08/07/20 2217)  Height: 6' (182 9 cm) (08/07/20 2217)  Weight - Scale: 105 kg (232 lb 9 4 oz) (08/07/20 2217)  SpO2: 98 % (08/07/20 2217)    Current Vitals:   Blood Pressure: 113/67 (08/08/20 0742)  Pulse: 74 (08/08/20 0742)  Temperature: (!) 97 4 °F (36 3 °C) (08/08/20 0742)  Temp Source: Oral (08/07/20 2217)  Respirations: 18 (08/07/20 2217)  Height: 6' (182 9 cm) (08/07/20 2217)  Weight - Scale: 105 kg (232 lb 9 4 oz) (08/07/20 2217)  SpO2: 97 % (08/08/20 0742)      Intake/Output Summary (Last 24 hours) at 8/8/2020 1336  Last data filed at 8/8/2020 0835  Gross per 24 hour   Intake 0 ml   Output 500 ml   Net -500 ml       Invasive Devices     Peripheral Intravenous Line            Peripheral IV 08/07/20 Left Antecubital less than 1 day    Peripheral IV 08/07/20 Right Antecubital less than 1 day                Physical Exam  HENT:      Head: Normocephalic and atraumatic  Right Ear: External ear normal       Left Ear: External ear normal       Nose: Nose normal       Mouth/Throat:      Mouth: Mucous membranes are dry  Eyes:      General: Scleral icterus present  Pupils: Pupils are equal, round, and reactive to light  Neck:      Musculoskeletal: Normal range of motion and neck supple  Cardiovascular:      Rate and Rhythm: Normal rate  Pulmonary:      Effort: Pulmonary effort is normal    Musculoskeletal:         General: No deformity  Skin:     General: Skin is warm  Coloration: Skin is jaundiced  Neurological:      General: No focal deficit present  Mental Status: He is alert  Psychiatric:         Mood and Affect: Mood normal          Behavior: Behavior normal      8/8/20  CT renal stone study abdomen pelvis wo contrast        Lab Results:   I have personally reviewed pertinent lab results    , CBC: Lab Results   Component Value Date    WBC 10 12 08/08/2020    HGB 15 7 08/08/2020    HCT 46 7 08/08/2020    MCV 84 08/08/2020     08/08/2020    MCH 28 4 08/08/2020    MCHC 33 6 08/08/2020    RDW 12 7 08/08/2020    MPV 9 6 08/08/2020    NRBC 0 08/07/2020   , CMP:   Lab Results   Component Value Date    SODIUM 140 08/08/2020    K 4 2 08/08/2020     08/08/2020    CO2 27 08/08/2020    BUN 10 08/08/2020    CREATININE 0 94 08/08/2020    CALCIUM 8 8 08/08/2020     (H) 08/08/2020     (H) 08/08/2020    ALKPHOS 173 (H) 08/08/2020    EGFR 112 08/08/2020     Imaging: I have personally reviewed pertinent reports  No orders to display     CT ABDOMEN AND PELVIS WITHOUT IV CONTRAST - LOW DOSE RENAL STONE      INDICATION:   Low back pain and dark-colored urine      COMPARISON:  None      TECHNIQUE:  Low dose thin section CT examination of the abdomen and pelvis was performed without intravenous or oral contrast according to a protocol specifically designed to evaluate for urinary tract calculus  Axial, sagittal, and coronal 2D   reformatted images were created from the source data and submitted for interpretation  Evaluation for pathology in the abdomen and pelvis that is unrelated to urinary tract calculi is limited       Radiation dose length product (DLP) for this visit:  519 mGy-cm   This examination, like all CT scans performed in the [facility], was performed utilizing techniques to minimize radiation dose exposure, including the use of iterative   reconstruction and automated exposure control       FINDINGS:     RIGHT KIDNEY AND URETER:  No urinary tract calculi  No hydronephrosis or hydroureter      LEFT KIDNEY AND URETER:  No urinary tract calculi  No hydronephrosis or hydroureter      URINARY BLADDER:   Unremarkable  No prostate enlargement      Clear lung bases      Mild hepatic steatosis  Noncontrast evaluation of the spleen is unremarkable    Gallstone in the gallbladder neck measuring 8 mm  No evidence of acute cholecystitis  Two adjacent calculi in the distal common bile duct within the head of the pancreas adjacent to the ampulla measuring 3 and 6 mm  Minimal dilatation of the common bile duct without intrahepatic bile duct dilatation  No bowel obstruction, colitis or diverticulitis  Normal appendix  Periumbilical 1 cm fat-containing hernia  No acute fracture or destructive osseous lesion is identified         IMPRESSION:        1  Single gallstone in the gallbladder neck without evidence of acute cholecystitis  2  Two calculi in the distal common bile duct within the head of the pancreas measuring 3 and 6 mm  Minimal dilatation of the proximal common bile duct without intrahepatic bile duct dilatation  GI consultation, ERCP and/or MRCP may be helpful for   further evaluation  3   No evidence of nephrolithiasis or obstructive uropathy      EKG, Pathology, and Other Studies: I have personally reviewed pertinent reports        Code Status: Level 1 - Full Code  Advance Directive and Living Will:      Power of :    POLST:

## 2020-08-09 PROBLEM — D72.829 LEUKOCYTOSIS: Status: RESOLVED | Noted: 2020-08-08 | Resolved: 2020-08-09

## 2020-08-09 LAB
ALBUMIN SERPL BCP-MCNC: 3.4 G/DL (ref 3.5–5)
ALP SERPL-CCNC: 171 U/L (ref 46–116)
ALT SERPL W P-5'-P-CCNC: 381 U/L (ref 12–78)
ANION GAP SERPL CALCULATED.3IONS-SCNC: 10 MMOL/L (ref 4–13)
AST SERPL W P-5'-P-CCNC: 181 U/L (ref 5–45)
BILIRUB SERPL-MCNC: 3.67 MG/DL (ref 0.2–1)
BUN SERPL-MCNC: 8 MG/DL (ref 5–25)
CALCIUM SERPL-MCNC: 9 MG/DL (ref 8.3–10.1)
CHLORIDE SERPL-SCNC: 105 MMOL/L (ref 100–108)
CO2 SERPL-SCNC: 24 MMOL/L (ref 21–32)
CREAT SERPL-MCNC: 0.9 MG/DL (ref 0.6–1.3)
ERYTHROCYTE [DISTWIDTH] IN BLOOD BY AUTOMATED COUNT: 13.1 % (ref 11.6–15.1)
GFR SERPL CREATININE-BSD FRML MDRD: 118 ML/MIN/1.73SQ M
GLUCOSE SERPL-MCNC: 93 MG/DL (ref 65–140)
HCT VFR BLD AUTO: 46.1 % (ref 36.5–49.3)
HGB BLD-MCNC: 15.5 G/DL (ref 12–17)
LIPASE SERPL-CCNC: 124 U/L (ref 73–393)
MCH RBC QN AUTO: 28.1 PG (ref 26.8–34.3)
MCHC RBC AUTO-ENTMCNC: 33.6 G/DL (ref 31.4–37.4)
MCV RBC AUTO: 84 FL (ref 82–98)
PLATELET # BLD AUTO: 177 THOUSANDS/UL (ref 149–390)
PMV BLD AUTO: 9.2 FL (ref 8.9–12.7)
POTASSIUM SERPL-SCNC: 4 MMOL/L (ref 3.5–5.3)
PROT SERPL-MCNC: 6.6 G/DL (ref 6.4–8.2)
RBC # BLD AUTO: 5.51 MILLION/UL (ref 3.88–5.62)
SARS-COV-2 RNA RESP QL NAA+PROBE: NEGATIVE
SODIUM SERPL-SCNC: 139 MMOL/L (ref 136–145)
WBC # BLD AUTO: 7.21 THOUSAND/UL (ref 4.31–10.16)

## 2020-08-09 PROCEDURE — 99232 SBSQ HOSP IP/OBS MODERATE 35: CPT | Performed by: INTERNAL MEDICINE

## 2020-08-09 PROCEDURE — U0003 INFECTIOUS AGENT DETECTION BY NUCLEIC ACID (DNA OR RNA); SEVERE ACUTE RESPIRATORY SYNDROME CORONAVIRUS 2 (SARS-COV-2) (CORONAVIRUS DISEASE [COVID-19]), AMPLIFIED PROBE TECHNIQUE, MAKING USE OF HIGH THROUGHPUT TECHNOLOGIES AS DESCRIBED BY CMS-2020-01-R: HCPCS | Performed by: INTERNAL MEDICINE

## 2020-08-09 PROCEDURE — 99232 SBSQ HOSP IP/OBS MODERATE 35: CPT | Performed by: SURGERY

## 2020-08-09 PROCEDURE — 85027 COMPLETE CBC AUTOMATED: CPT | Performed by: STUDENT IN AN ORGANIZED HEALTH CARE EDUCATION/TRAINING PROGRAM

## 2020-08-09 PROCEDURE — 94760 N-INVAS EAR/PLS OXIMETRY 1: CPT

## 2020-08-09 PROCEDURE — 83690 ASSAY OF LIPASE: CPT | Performed by: STUDENT IN AN ORGANIZED HEALTH CARE EDUCATION/TRAINING PROGRAM

## 2020-08-09 PROCEDURE — 80053 COMPREHEN METABOLIC PANEL: CPT | Performed by: STUDENT IN AN ORGANIZED HEALTH CARE EDUCATION/TRAINING PROGRAM

## 2020-08-09 RX ORDER — HYDROMORPHONE HCL/PF 1 MG/ML
1 SYRINGE (ML) INJECTION
Status: DISCONTINUED | OUTPATIENT
Start: 2020-08-09 | End: 2020-08-10

## 2020-08-09 RX ADMIN — METRONIDAZOLE 500 MG: 500 INJECTION, SOLUTION INTRAVENOUS at 02:15

## 2020-08-09 RX ADMIN — ONDANSETRON 4 MG: 2 INJECTION INTRAMUSCULAR; INTRAVENOUS at 02:14

## 2020-08-09 RX ADMIN — SODIUM CHLORIDE, SODIUM GLUCONATE, SODIUM ACETATE, POTASSIUM CHLORIDE AND MAGNESIUM CHLORIDE 100 ML/HR: 526; 502; 368; 37; 30 INJECTION, SOLUTION INTRAVENOUS at 10:56

## 2020-08-09 RX ADMIN — SODIUM CHLORIDE, SODIUM GLUCONATE, SODIUM ACETATE, POTASSIUM CHLORIDE AND MAGNESIUM CHLORIDE 100 ML/HR: 526; 502; 368; 37; 30 INJECTION, SOLUTION INTRAVENOUS at 21:13

## 2020-08-09 RX ADMIN — OXYCODONE HYDROCHLORIDE 5 MG: 5 TABLET ORAL at 11:03

## 2020-08-09 RX ADMIN — HYDROMORPHONE HYDROCHLORIDE 1 MG: 1 INJECTION, SOLUTION INTRAMUSCULAR; INTRAVENOUS; SUBCUTANEOUS at 22:46

## 2020-08-09 RX ADMIN — MELATONIN 3 MG: at 21:13

## 2020-08-09 RX ADMIN — ONDANSETRON 4 MG: 2 INJECTION INTRAMUSCULAR; INTRAVENOUS at 11:02

## 2020-08-09 RX ADMIN — METRONIDAZOLE 500 MG: 500 INJECTION, SOLUTION INTRAVENOUS at 10:55

## 2020-08-09 RX ADMIN — HYDROMORPHONE HYDROCHLORIDE 1 MG: 1 INJECTION, SOLUTION INTRAMUSCULAR; INTRAVENOUS; SUBCUTANEOUS at 17:21

## 2020-08-09 RX ADMIN — ONDANSETRON 4 MG: 2 INJECTION INTRAMUSCULAR; INTRAVENOUS at 17:22

## 2020-08-09 RX ADMIN — ONDANSETRON 4 MG: 2 INJECTION INTRAMUSCULAR; INTRAVENOUS at 22:46

## 2020-08-09 RX ADMIN — METRONIDAZOLE 500 MG: 500 INJECTION, SOLUTION INTRAVENOUS at 19:22

## 2020-08-09 RX ADMIN — CEFTRIAXONE SODIUM 2000 MG: 2 INJECTION, POWDER, FOR SOLUTION INTRAMUSCULAR; INTRAVENOUS at 05:47

## 2020-08-09 NOTE — ASSESSMENT & PLAN NOTE
- 1 month hx of LBP that has become constant in past 1-2 days, now with worsening RUQ/epigastric pain w/ dark urine/jaundice/NBNB emesis  AVSS on arrival  , , Alk-phos 188, Tbili 4 98, WBC 11 13  CT in ED demonstrated stone in gallbladder neck w/o acute cholecystitis, 2 calculi in distal CBD within head of pancreas w/o intrahepatic ductal dilatation  [facility] hospitals for likely ERCP, GI eval    - S/p ERCP with sphincterotomy 8/10/2020  - POD1 s/p lap randy  - LFTS:   -    -    - Alk Phos 144   - T bili 1 04  - WBC: 16 27    Plan:  · KUB in 7-10 days to monitor pancreatic stent   If stent does not pass, patient will need EGD  · trend LFTs and WBC  · Pain regimen ordered: 975 mg Tylenol for mild pain, oxy 5mg for moderate pain oxy 10mg for severe pain  · PRN zofran  · Patient okay for discharge today

## 2020-08-09 NOTE — PROGRESS NOTES
INTERNAL MEDICINE RESIDENCY PROGRESS NOTE     Name: Fernando Stout   Age & Sex: 22 y o  male   MRN: [id number]  Unit/Bed#: -01   Encounter: [id number]  Team: SOD Team A    PATIENT INFORMATION     Name: Fernando Stout   Age & Sex: 22 y o  male   MRN: [id number]  Hospital Stay Days: 2    ASSESSMENT/PLAN     Principal Problem:    Calculus of common bile duct with obstruction  Active Problems:    Transaminitis    Hyperbilirubinemia    Tobacco abuse    Obesity (BMI 30 0-34  9)    Microcytosis      * Calculus of common bile duct with obstruction  Assessment & Plan  1 month hx of LBP that has become constant in past 1-2 days, now with worsening RUQ/epigastric pain w/ dark urine/jaundice/NBNB emesis  AVSS on arrival  , , Alk-phos 188, Tbili 4 98, WBC 11 13  CT in ED demonstrated stone in gallbladder neck w/o acute cholecystitis, 2 calculi in distal CBD within head of pancreas w/o intrahepatic ductal dilatation  Transfer from [facility] to [facility] for likely ERCP, GI eval      Plan:  · continue Empiric IV ceftriaxone and flagyl (Day 3)  · trend LFTs and WBC  · Pain regimen ordered oxy 5mg for moderate pain oxy 10mg for severe pain, and dilaudid 1mg for breakthrough pain - has required 2 doses of dilaudid and 1 dose of oxy 10 over 24 hours   · PRN zofran  · Continue fluids - isolyte 100cc  · GI consulted - ERCP will likely be done Monday  · General surgery consulted - recommending cholecystectomy after completion of ERCP     Transaminitis  Assessment & Plan    on arrival and have trended down to 181/381  T Bili trending down from 4 98 to 3 67  HIV and Hep Panel negative    Plan:  · Likely 2/2 to cholestasis  · Continue to trend    Hyperbilirubinemia  Assessment & Plan  Tbili 4 98, jaundice on arrival, trended down to 3 67 today    Plan:  -continue to trend    Obesity (BMI 30 0-34  9)  Assessment & Plan  HgbA1C 5 6, Total cholesterol 198, , , HDL 20, TSH 0 98  · Educated on diet and lifestyle modifications    Tobacco abuse  Assessment & Plan  Current smoker, 1 5 ppd  Plan:  -smoking cessation counseling, nicotine patch deferred    Leukocytosis-resolved as of 2020  Assessment & Plan  Mild leukocytosis on arrival, WBC 11 13 but has trended down to 7 21 today    Plan:  · Continue to trend      Disposition: Continue inpatient management  ERCP tomorrow  SUBJECTIVE     Patient seen and examined  No acute events overnight  States pain has improved  Has been asking for pain medication when necessary  Denies any fevers, chills, chest pain, shortness of breath, N/V/D/C  Denies urinary symptoms at this time  No bowel movement since admission  Aware that GI is planning for ERCP on Monday  OBJECTIVE     Vitals:    20 1526 20 2329 20 0731 20 0735   BP: 115/70 122/72  124/76   Pulse: 77 (!) 50  75   Resp: 16 18     Temp: 97 8 °F (36 6 °C) 97 7 °F (36 5 °C)  (!) 97 4 °F (36 3 °C)   TempSrc:       SpO2: 96% 95% 94% 95%   Weight:       Height:          Temperature:   Temp (24hrs), Av 6 °F (36 4 °C), Min:97 4 °F (36 3 °C), Max:97 8 °F (36 6 °C)    Temperature: (!) 97 4 °F (36 3 °C)  Intake & Output:  I/O        0701 -  0700 08 07 -  0700 / 07 - 08/10 0700    P  O   420     I V  (mL/kg)  1600 (15 2)     IV Piggyback  150     Total Intake(mL/kg)  2170 (20 7)     Urine (mL/kg/hr)  2600 (1)     Emesis/NG output  2     Total Output  2602     Net  -432                Weights:   IBW: 77 6 kg    Body mass index is 31 54 kg/m²  Weight (last 2 days)     Date/Time   Weight    20 2217   105 (232 59)            Physical Exam  Vitals signs and nursing note reviewed  Constitutional:       Appearance: Normal appearance  HENT:      Head: Normocephalic and atraumatic  Mouth/Throat:      Mouth: Mucous membranes are moist    Eyes:      Conjunctiva/sclera: Conjunctivae normal    Neck:      Musculoskeletal: Neck supple     Cardiovascular:      Rate and Rhythm: Normal rate and regular rhythm  Pulses: Normal pulses  Heart sounds: Normal heart sounds  Pulmonary:      Effort: Pulmonary effort is normal       Breath sounds: Normal breath sounds  Abdominal:      General: Abdomen is flat  Bowel sounds are normal  There is no distension  Palpations: Abdomen is soft  Tenderness: There is abdominal tenderness  Comments: Mild tenderness to the right upper quadrant, Calderon sign negative   Musculoskeletal:      Right lower leg: No edema  Left lower leg: No edema  Comments: Radiation of R upper quadrant pain to R back   Skin:     General: Skin is warm and dry  Neurological:      General: No focal deficit present  Mental Status: He is alert and oriented to person, place, and time  Psychiatric:         Mood and Affect: Mood normal          Behavior: Behavior normal  Behavior is cooperative  LABORATORY DATA     Labs: I have personally reviewed pertinent reports  Results from last 7 days   Lab Units 08/09/20 0555 08/08/20  0544 08/07/20 1915   WBC Thousand/uL 7 21 10 12 11 13*   HEMOGLOBIN g/dL 15 5 15 7 16 9   HEMATOCRIT % 46 1 46 7 48 4   PLATELETS Thousands/uL 177 204 233   NEUTROS PCT %  --   --  77*   MONOS PCT %  --   --  6      Results from last 7 days   Lab Units 08/09/20  0555 08/08/20  0543 08/07/20 1915   POTASSIUM mmol/L 4 0 4 2 4 1   CHLORIDE mmol/L 105 106 101   CO2 mmol/L 24 27 30   BUN mg/dL 8 10 10   CREATININE mg/dL 0 90 0 94 1 13   CALCIUM mg/dL 9 0 8 8 9 4   ALK PHOS U/L 171* 173* 188*   ALT U/L 381* 422* 545*   AST U/L 181* 219* 309*              Results from last 7 days   Lab Units 08/07/20 1915   INR  0 96   PTT seconds 27               IMAGING & DIAGNOSTIC TESTING     Radiology Results: I have personally reviewed pertinent reports  Ct Renal Stone Study Abdomen Pelvis Wo Contrast    Result Date: 8/7/2020  Impression: 1    Single gallstone in the gallbladder neck without evidence of acute cholecystitis  2  Two calculi in the distal common bile duct within the head of the pancreas measuring 3 and 6 mm  Minimal dilatation of the proximal common bile duct without intrahepatic bile duct dilatation  GI consultation, ERCP and/or MRCP may be helpful for further evaluation  3   No evidence of nephrolithiasis or obstructive uropathy  Workstation performed: [id number]     Other Diagnostic Testing: I have personally reviewed pertinent reports  ACTIVE MEDICATIONS     Current Facility-Administered Medications   Medication Dose Route Frequency    acetaminophen (TYLENOL) tablet 975 mg  975 mg Oral Q6H PRN    cefTRIAXone (ROCEPHIN) 2,000 mg in dextrose 5 % 50 mL IVPB  2,000 mg Intravenous Q24H    diphenhydrAMINE (BENADRYL) tablet 12 5 mg  12 5 mg Oral HS PRN    HYDROmorphone (DILAUDID) injection 1 mg  1 mg Intravenous Q3H PRN    melatonin tablet 3 mg  3 mg Oral HS    metroNIDAZOLE (FLAGYL) IVPB (premix) 500 mg 100 mL  500 mg Intravenous Q8H    multi-electrolyte (PLASMALYTE-A/ISOLYTE-S PH 7 4) IV solution  100 mL/hr Intravenous Continuous    ondansetron (ZOFRAN) injection 4 mg  4 mg Intravenous Q6H PRN    oxyCODONE (ROXICODONE) immediate release tablet 10 mg  10 mg Oral Q4H PRN    oxyCODONE (ROXICODONE) IR tablet 5 mg  5 mg Oral Q4H PRN       VTE Pharmacologic Prophylaxis: Reason for no pharmacologic prophylaxis low risk  VTE Mechanical Prophylaxis: reason for no mechanical VTE prophylaxis low risk    Portions of the record may have been created with voice recognition software  Occasional wrong word or "sound a like" substitutions may have occurred due to the inherent limitations of voice recognition software    Read the chart carefully and recognize, using context, where substitutions have occurred   ==  Kristie Berkowitz, [facility]  Internal Medicine Residency PGY-1

## 2020-08-09 NOTE — ASSESSMENT & PLAN NOTE
Mild leukocytosis on arrival, WBC 11 13 but has trended down to 7 21 today    Plan:  · Continue to trend

## 2020-08-09 NOTE — ASSESSMENT & PLAN NOTE
on arrival and have trended down to 106/267  T Bili trending down from 4 98 to 1 04   HIV and Hep Panel negative    Plan:  · Likely 2/2 to cholestasis  · Continue to trend

## 2020-08-09 NOTE — UTILIZATION REVIEW
Initial Clinical Review    Admission: Date/Time/Statement:   Admission Orders (From admission, onward)     Ordered        08/07/20 2235  Inpatient Admission  Once                   Orders Placed This Encounter   Procedures    Inpatient Admission     Standing Status:   Standing     Number of Occurrences:   1     Order Specific Question:   Admitting Physician     Answer:   Kaiser Suárez     Order Specific Question:   Level of Care     Answer:   Med Surg [16]     Order Specific Question:   Estimated length of stay     Answer:   More than 2 Midnights     Order Specific Question:   Certification     Answer:   I certify that inpatient services are medically necessary for this patient for a duration of greater than two midnights  See H&P and MD Progress Notes for additional information about the patient's course of treatment  ED Arrival Information     Patient not seen in ED -- tx'd from [address] [place] [address] ED                     Chief complaint:  Back pain    Assessment/Plan: 22 y o  male with no significant Pmhx that initially presented to [address] [place] [address] ED with c/o lower back pain that started ~1 mo ago with worsening back, with  epigastric pain and dark urine last 2 days  States pain was intermittent initially then became constant last 2 days  Admits to nausea and NBNB vomiting with decreased po intake  In ED at [facility] pt was borderline tachycardic  Blood cxs drawn, started on IV ceftriaxone  Noted with elevated LFT's: , , alk-phos 188, total bilirubin 4 98   1+ protein, 1+ ketones on UA, mild leukocytosis of 11 13   CT demonstrated single gallstone in gallbladder neck without evidence of acute cholecystitis, 2 calculi distal CBD within head of pancreas measuring 3 and 6 mm, minimal dilatation of proximal CBD without intrahepatic bile duct dilatation  Tx'd to [facility] for GI eval and further tx  On exam pt noted with scleral icterus  + abd tenderness  Jaundiced  Admit inpatient to M/S unit with calculus of common bile duct with obstruction  Continue IV ceftriaxone, trend LFT's/WBC  Npo after MN  Troadol/zofran prn  IVF's      8/8 -- GI and surgery consulted -- GI plans for ERCP on Monday pending GI Lab availability  Npo after MN Sunday  Surgery plans for cholecystectomy after ERCP done and choledocholithiasis treated  Vital Signs:   Date/Time   Temp   Pulse   Resp   BP   MAP (mmHg)   SpO2   O2 Device    08/08/20 23:29:05   97 7 °F (36 5 °C)   50Abnormal     18   122/72   89   95 %   --    08/08/20 1610   --   --   --   --   --   --   None (Room air)    08/08/20 15:26:10   97 8 °F (36 6 °C)   77   16   115/70   85   96 %   --    08/08/20 07:42:06   97 4 °F (36 3 °C)Abnormal     74   --   113/67   82   97 %   None (Room air)    08/08/20 0324   --   --   --   --   --   95 %   None (Room air)    08/07/20 2217   98 5 °F (36 9 °C)   85   18   140/85   --   98 %   None (Room air)        Pertinent Labs/Diagnostic Test Results:   CT renal stone study a/p 8/7 --   1   Single gallstone in the gallbladder neck without evidence of acute cholecystitis  2  Two calculi in the distal common bile duct within the head of the pancreas measuring 3 and 6 mm   Minimal dilatation of the proximal common bile duct without intrahepatic bile duct dilatation   GI consultation, ERCP and/or MRCP may be helpful for further evaluation  3   No evidence of nephrolithiasis or obstructive uropathy       Results from last 7 days   Lab Units 08/08/20  0544 08/07/20  1915   WBC Thousand/uL 10 12 11 13*   HEMOGLOBIN g/dL 15 7 16 9   HEMATOCRIT % 46 7 48 4   PLATELETS Thousands/uL 204 233   NEUTROS ABS Thousands/µL  --  8 61*     Results from last 7 days   Lab Units 08/08/20  0543 08/07/20  1915   SODIUM mmol/L 140 140   POTASSIUM mmol/L 4 2 4 1   CHLORIDE mmol/L 106 101   CO2 mmol/L 27 30   ANION GAP mmol/L 7 9   BUN mg/dL 10 10   CREATININE mg/dL 0 94 1 13   EGFR ml/min/1 73sq m 112 90 CALCIUM mg/dL 8 8 9 4     Results from last 7 days   Lab Units 08/08/20  0543 08/07/20  1915   AST U/L 219* 309*   ALT U/L 422* 545*   ALK PHOS U/L 173* 188*   TOTAL PROTEIN g/dL 6 6 7 6   ALBUMIN g/dL 3 5 4 3   TOTAL BILIRUBIN mg/dL 4 26* 4 98*     Results from last 7 days   Lab Units 08/08/20  0543 08/07/20 1915   GLUCOSE RANDOM mg/dL 112 109     Results from last 7 days   Lab Units 08/07/20 1915   HEMOGLOBIN A1C % 5 6   EAG mg/dl 114     Results from last 7 days   Lab Units 08/07/20 1915   CK TOTAL U/L 75     Results from last 7 days   Lab Units 08/07/20 1915   PROTIME seconds 12 6   INR  0 96   PTT seconds 27     Results from last 7 days   Lab Units 08/07/20 1915   TSH 3RD GENERATON uIU/mL 0 980     Results from last 7 days   Lab Units 08/08/20  0543   HEP B S AG  Non-reactive   HEP C AB  Non-reactive   HEP B C IGM  Non-reactive   HEP B C TOTAL AB  Non-reactive     Results from last 7 days   Lab Units 08/07/20 1915   LIPASE u/L 183     Results from last 7 days   Lab Units 08/07/20 1919   CLARITY UA  Clear   COLOR UA  [place]   SPEC GRAV UA  1 015   PH UA  8 5*   GLUCOSE UA mg/dl Negative   KETONES UA mg/dl 15 (1+)*   BLOOD UA  Negative   PROTEIN UA mg/dl 30 (1+)*   NITRITE UA  Negative   BILIRUBIN UA  Large*   UROBILINOGEN UA E U /dl 1 0   LEUKOCYTES UA  Negative   WBC UA /hpf 0-1*   RBC UA /hpf 0-1*   BACTERIA UA /hpf Moderate*   EPITHELIAL CELLS WET PREP /hpf Occasional   MUCUS THREADS  Occasional*     Results from last 7 days   Lab Units 08/07/20 2027 08/07/20 2026   BLOOD CULTURE  No Growth at 24 hrs  No Growth at 24 hrs  No past medical history on file  Present on Admission:   Calculus of common bile duct with obstruction   Tobacco abuse   Obesity (BMI 30 0-34  9)   Transaminitis   (Resolved) Leukocytosis   Hyperbilirubinemia   Microcytosis      Admitting Diagnosis: Calculus of common bile duct with obstruction [K80 51]  Age/Sex: 22 y o  male  Admission Orders:  Scheduled Medications:  cefTRIAXone, 2,000 mg, Intravenous, Q24H  melatonin, 3 mg, Oral, HS  metroNIDAZOLE, 500 mg, Intravenous, Q8H    Continuous IV Infusions:  multi-electrolyte, 100 mL/hr, Intravenous, Continuous      PRN Meds:  acetaminophen, 975 mg, Oral, Q6H PRN  diphenhydrAMINE, 12 5 mg, Oral, HS PRN  8/8 x1  HYDROmorphone, 1 mg, Intravenous, Q3H PRN 8/8 x2  ondansetron, 4 mg, Intravenous, Q6H PRN  8/8 x3  oxyCODONE, 10 mg, Oral, Q4H PRN  8/8 x1  oxyCODONE, 5 mg, Oral, Q4H PRN        IP CONSULT TO GASTROENTEROLOGY  IP CONSULT TO [address] [place] [address] Utilization Review Department  [email or web address]  ATTENTION: Please call with any questions or concerns to [phone] and carefully listen to the prompts so that you are directed to the right person  All voicemails are confidential   Braulio Fay all requests for admission clinical reviews, approved or denied determinations and any other requests to dedicated fax number below belonging to the campus where the patient is receiving treatment   List of dedicated fax numbers for the Facilities:  [address] [place] [address] DENIALS (Administrative/Medical Necessity) [phone]   [address]  (Maternity/NICU/Pediatrics) [phone]   [facility] [phone]   [facility] [phone]   Lewis Bello [phone]   Maribell Hassan [phone]   [address] [facility] [address] [facility] [phone]   [facility]  [phone]   [address] [facility] [address]  [address] [facility] [address] [facility] [phone]

## 2020-08-09 NOTE — PROGRESS NOTES
Progress Note - General Surgery   [facility] 22 y o  male MRN: [id number]  Unit/Bed#: -01 Encounter: [id number]    Assessment:  22 M with choledocholithiasis  VSS, Afebrile    Plan:  ERCP Monday  -cholecystectomy to follow  Care per primary team     Subjective/Objective     Subjective:   No acute events overnight  Pain improving  Tolerating clears  Objective:    Blood pressure 122/72, pulse (!) 50, temperature 97 7 °F (36 5 °C), resp  rate 18, height 6' (1 829 m), weight 105 kg (232 lb 9 4 oz), SpO2 95 %  ,Body mass index is 31 54 kg/m²  Intake/Output Summary (Last 24 hours) at 8/9/2020 0311  Last data filed at 8/8/2020 1929  Gross per 24 hour   Intake 1320 ml   Output 1702 ml   Net -382 ml       Invasive Devices     Peripheral Intravenous Line            Peripheral IV 08/07/20 Left Antecubital 1 day    Peripheral IV 08/07/20 Right Antecubital 1 day                Physical Exam:   Gen:  Well-developed, well-nourished male in NAD  HEENT:   +scleral icterus  normocephalic, atraumatic  Neck supple  Trachea midline  Lungs: Normal respiratory effort on RA  Abd: soft, tender, distended  Positive Calderon sign  Skin: warm/ dry  Extremities: pulses 2+  Neuro: AxO x3      Results from last 7 days   Lab Units 08/08/20  0544 08/07/20 1915   WBC Thousand/uL 10 12 11 13*   HEMOGLOBIN g/dL 15 7 16 9   HEMATOCRIT % 46 7 48 4   PLATELETS Thousands/uL 204 233     Results from last 7 days   Lab Units 08/08/20  0543 08/07/20 1915   POTASSIUM mmol/L 4 2 4 1   CHLORIDE mmol/L 106 101   CO2 mmol/L 27 30   BUN mg/dL 10 10   CREATININE mg/dL 0 94 1 13   CALCIUM mg/dL 8 8 9 4     Results from last 7 days   Lab Units 08/07/20 1915   INR  0 96   PTT seconds 27        I have personally reviewed pertinent films in PACS

## 2020-08-09 NOTE — ASSESSMENT & PLAN NOTE
HgbA1C 5 6, Total cholesterol 198, , , HDL 20, TSH 0 98  · Educated on diet and lifestyle modifications

## 2020-08-10 ENCOUNTER — APPOINTMENT (INPATIENT)
Dept: RADIOLOGY | Facility: HOSPITAL | Age: 25
DRG: 419 | End: 2020-08-10
Payer: COMMERCIAL

## 2020-08-10 ENCOUNTER — ANESTHESIA (INPATIENT)
Dept: GASTROENTEROLOGY | Facility: HOSPITAL | Age: 25
DRG: 419 | End: 2020-08-10
Payer: COMMERCIAL

## 2020-08-10 ENCOUNTER — APPOINTMENT (INPATIENT)
Dept: GASTROENTEROLOGY | Facility: HOSPITAL | Age: 25
DRG: 419 | End: 2020-08-10
Attending: INTERNAL MEDICINE
Payer: COMMERCIAL

## 2020-08-10 ENCOUNTER — ANESTHESIA EVENT (INPATIENT)
Dept: GASTROENTEROLOGY | Facility: HOSPITAL | Age: 25
DRG: 419 | End: 2020-08-10
Payer: COMMERCIAL

## 2020-08-10 LAB
ALBUMIN SERPL BCP-MCNC: 3.8 G/DL (ref 3.5–5)
ALP SERPL-CCNC: 188 U/L (ref 46–116)
ALT SERPL W P-5'-P-CCNC: 379 U/L (ref 12–78)
ANION GAP SERPL CALCULATED.3IONS-SCNC: 8 MMOL/L (ref 4–13)
AST SERPL W P-5'-P-CCNC: 209 U/L (ref 5–45)
BASOPHILS # BLD AUTO: 0.04 THOUSANDS/ΜL (ref 0–0.1)
BASOPHILS NFR BLD AUTO: 1 % (ref 0–1)
BILIRUB SERPL-MCNC: 4.32 MG/DL (ref 0.2–1)
BUN SERPL-MCNC: 10 MG/DL (ref 5–25)
CALCIUM SERPL-MCNC: 9.2 MG/DL (ref 8.3–10.1)
CHLORIDE SERPL-SCNC: 103 MMOL/L (ref 100–108)
CO2 SERPL-SCNC: 28 MMOL/L (ref 21–32)
CREAT SERPL-MCNC: 0.98 MG/DL (ref 0.6–1.3)
EOSINOPHIL # BLD AUTO: 0.18 THOUSAND/ΜL (ref 0–0.61)
EOSINOPHIL NFR BLD AUTO: 2 % (ref 0–6)
ERYTHROCYTE [DISTWIDTH] IN BLOOD BY AUTOMATED COUNT: 13.1 % (ref 11.6–15.1)
GFR SERPL CREATININE-BSD FRML MDRD: 107 ML/MIN/1.73SQ M
GLUCOSE SERPL-MCNC: 85 MG/DL (ref 65–140)
HCT VFR BLD AUTO: 50 % (ref 36.5–49.3)
HGB BLD-MCNC: 17.1 G/DL (ref 12–17)
IMM GRANULOCYTES # BLD AUTO: 0.03 THOUSAND/UL (ref 0–0.2)
IMM GRANULOCYTES NFR BLD AUTO: 0 % (ref 0–2)
LYMPHOCYTES # BLD AUTO: 2.41 THOUSANDS/ΜL (ref 0.6–4.47)
LYMPHOCYTES NFR BLD AUTO: 29 % (ref 14–44)
MCH RBC QN AUTO: 28.8 PG (ref 26.8–34.3)
MCHC RBC AUTO-ENTMCNC: 34.2 G/DL (ref 31.4–37.4)
MCV RBC AUTO: 84 FL (ref 82–98)
MONOCYTES # BLD AUTO: 0.71 THOUSAND/ΜL (ref 0.17–1.22)
MONOCYTES NFR BLD AUTO: 9 % (ref 4–12)
NEUTROPHILS # BLD AUTO: 4.9 THOUSANDS/ΜL (ref 1.85–7.62)
NEUTS SEG NFR BLD AUTO: 59 % (ref 43–75)
NRBC BLD AUTO-RTO: 0 /100 WBCS
PLATELET # BLD AUTO: 205 THOUSANDS/UL (ref 149–390)
PMV BLD AUTO: 9.5 FL (ref 8.9–12.7)
POTASSIUM SERPL-SCNC: 4.4 MMOL/L (ref 3.5–5.3)
PROT SERPL-MCNC: 7.3 G/DL (ref 6.4–8.2)
RBC # BLD AUTO: 5.94 MILLION/UL (ref 3.88–5.62)
SODIUM SERPL-SCNC: 139 MMOL/L (ref 136–145)
WBC # BLD AUTO: 8.27 THOUSAND/UL (ref 4.31–10.16)

## 2020-08-10 PROCEDURE — 0FC98ZZ EXTIRPATION OF MATTER FROM COMMON BILE DUCT, VIA NATURAL OR ARTIFICIAL OPENING ENDOSCOPIC: ICD-10-PCS | Performed by: INTERNAL MEDICINE

## 2020-08-10 PROCEDURE — 74328 X-RAY BILE DUCT ENDOSCOPY: CPT

## 2020-08-10 PROCEDURE — 43274 ERCP DUCT STENT PLACEMENT: CPT | Performed by: INTERNAL MEDICINE

## 2020-08-10 PROCEDURE — 43264 ERCP REMOVE DUCT CALCULI: CPT | Performed by: INTERNAL MEDICINE

## 2020-08-10 PROCEDURE — 85025 COMPLETE CBC W/AUTO DIFF WBC: CPT | Performed by: STUDENT IN AN ORGANIZED HEALTH CARE EDUCATION/TRAINING PROGRAM

## 2020-08-10 PROCEDURE — 99232 SBSQ HOSP IP/OBS MODERATE 35: CPT | Performed by: SURGERY

## 2020-08-10 PROCEDURE — 0F7D8DZ DILATION OF PANCREATIC DUCT WITH INTRALUMINAL DEVICE, VIA NATURAL OR ARTIFICIAL OPENING ENDOSCOPIC: ICD-10-PCS | Performed by: INTERNAL MEDICINE

## 2020-08-10 PROCEDURE — C1769 GUIDE WIRE: HCPCS

## 2020-08-10 PROCEDURE — 99231 SBSQ HOSP IP/OBS SF/LOW 25: CPT | Performed by: INTERNAL MEDICINE

## 2020-08-10 PROCEDURE — 80053 COMPREHEN METABOLIC PANEL: CPT | Performed by: STUDENT IN AN ORGANIZED HEALTH CARE EDUCATION/TRAINING PROGRAM

## 2020-08-10 PROCEDURE — C2617 STENT, NON-COR, TEM W/O DEL: HCPCS

## 2020-08-10 RX ORDER — METOCLOPRAMIDE HYDROCHLORIDE 5 MG/ML
10 INJECTION INTRAMUSCULAR; INTRAVENOUS ONCE AS NEEDED
Status: COMPLETED | OUTPATIENT
Start: 2020-08-10 | End: 2020-08-10

## 2020-08-10 RX ORDER — PROPOFOL 10 MG/ML
INJECTION, EMULSION INTRAVENOUS AS NEEDED
Status: DISCONTINUED | OUTPATIENT
Start: 2020-08-10 | End: 2020-08-10

## 2020-08-10 RX ORDER — ONDANSETRON 2 MG/ML
INJECTION INTRAMUSCULAR; INTRAVENOUS AS NEEDED
Status: DISCONTINUED | OUTPATIENT
Start: 2020-08-10 | End: 2020-08-10

## 2020-08-10 RX ORDER — CEFAZOLIN SODIUM 2 G/50ML
2000 SOLUTION INTRAVENOUS
Status: DISCONTINUED | OUTPATIENT
Start: 2020-08-11 | End: 2020-08-11 | Stop reason: HOSPADM

## 2020-08-10 RX ORDER — FENTANYL CITRATE 50 UG/ML
INJECTION, SOLUTION INTRAMUSCULAR; INTRAVENOUS AS NEEDED
Status: DISCONTINUED | OUTPATIENT
Start: 2020-08-10 | End: 2020-08-10

## 2020-08-10 RX ORDER — HYDROMORPHONE HCL/PF 1 MG/ML
1 SYRINGE (ML) INJECTION
Status: DISCONTINUED | OUTPATIENT
Start: 2020-08-10 | End: 2020-08-12

## 2020-08-10 RX ORDER — DEXAMETHASONE SODIUM PHOSPHATE 10 MG/ML
INJECTION, SOLUTION INTRAMUSCULAR; INTRAVENOUS AS NEEDED
Status: DISCONTINUED | OUTPATIENT
Start: 2020-08-10 | End: 2020-08-10

## 2020-08-10 RX ORDER — SODIUM CHLORIDE 9 MG/ML
INJECTION, SOLUTION INTRAVENOUS CONTINUOUS PRN
Status: DISCONTINUED | OUTPATIENT
Start: 2020-08-10 | End: 2020-08-10

## 2020-08-10 RX ORDER — SUCCINYLCHOLINE/SOD CL,ISO/PF 100 MG/5ML
SYRINGE (ML) INTRAVENOUS AS NEEDED
Status: DISCONTINUED | OUTPATIENT
Start: 2020-08-10 | End: 2020-08-10

## 2020-08-10 RX ORDER — ROCURONIUM BROMIDE 10 MG/ML
INJECTION, SOLUTION INTRAVENOUS AS NEEDED
Status: DISCONTINUED | OUTPATIENT
Start: 2020-08-10 | End: 2020-08-10

## 2020-08-10 RX ORDER — LIDOCAINE HYDROCHLORIDE 10 MG/ML
INJECTION, SOLUTION EPIDURAL; INFILTRATION; INTRACAUDAL; PERINEURAL AS NEEDED
Status: DISCONTINUED | OUTPATIENT
Start: 2020-08-10 | End: 2020-08-10

## 2020-08-10 RX ADMIN — ONDANSETRON 4 MG: 2 INJECTION INTRAMUSCULAR; INTRAVENOUS at 14:31

## 2020-08-10 RX ADMIN — METRONIDAZOLE 500 MG: 500 INJECTION, SOLUTION INTRAVENOUS at 18:36

## 2020-08-10 RX ADMIN — CEFTRIAXONE SODIUM 2000 MG: 2 INJECTION, POWDER, FOR SOLUTION INTRAMUSCULAR; INTRAVENOUS at 05:00

## 2020-08-10 RX ADMIN — OXYCODONE HYDROCHLORIDE 10 MG: 10 TABLET ORAL at 05:35

## 2020-08-10 RX ADMIN — MELATONIN 3 MG: at 22:05

## 2020-08-10 RX ADMIN — ONDANSETRON 4 MG: 2 INJECTION INTRAMUSCULAR; INTRAVENOUS at 15:42

## 2020-08-10 RX ADMIN — Medication 200 MG: at 13:39

## 2020-08-10 RX ADMIN — PROPOFOL 200 MG: 10 INJECTION, EMULSION INTRAVENOUS at 13:39

## 2020-08-10 RX ADMIN — SODIUM CHLORIDE: 0.9 INJECTION, SOLUTION INTRAVENOUS at 13:34

## 2020-08-10 RX ADMIN — IOHEXOL 16 ML: 240 INJECTION, SOLUTION INTRATHECAL; INTRAVASCULAR; INTRAVENOUS; ORAL at 13:40

## 2020-08-10 RX ADMIN — METOCLOPRAMIDE 10 MG: 5 INJECTION, SOLUTION INTRAMUSCULAR; INTRAVENOUS at 15:03

## 2020-08-10 RX ADMIN — FENTANYL CITRATE 100 MCG: 50 INJECTION, SOLUTION INTRAMUSCULAR; INTRAVENOUS at 13:37

## 2020-08-10 RX ADMIN — METRONIDAZOLE 500 MG: 500 INJECTION, SOLUTION INTRAVENOUS at 02:49

## 2020-08-10 RX ADMIN — LIDOCAINE HYDROCHLORIDE 50 MG: 10 INJECTION, SOLUTION EPIDURAL; INFILTRATION; INTRACAUDAL; PERINEURAL at 13:37

## 2020-08-10 RX ADMIN — DEXAMETHASONE SODIUM PHOSPHATE 10 MG: 10 INJECTION, SOLUTION INTRAMUSCULAR; INTRAVENOUS at 14:31

## 2020-08-10 RX ADMIN — ONDANSETRON 4 MG: 2 INJECTION INTRAMUSCULAR; INTRAVENOUS at 05:35

## 2020-08-10 RX ADMIN — SUGAMMADEX 300 MG: 100 INJECTION, SOLUTION INTRAVENOUS at 14:28

## 2020-08-10 RX ADMIN — ROCURONIUM BROMIDE 30 MG: 10 INJECTION, SOLUTION INTRAVENOUS at 13:43

## 2020-08-10 RX ADMIN — METRONIDAZOLE 500 MG: 500 INJECTION, SOLUTION INTRAVENOUS at 12:36

## 2020-08-10 RX ADMIN — SODIUM CHLORIDE, SODIUM GLUCONATE, SODIUM ACETATE, POTASSIUM CHLORIDE AND MAGNESIUM CHLORIDE 100 ML/HR: 526; 502; 368; 37; 30 INJECTION, SOLUTION INTRAVENOUS at 11:25

## 2020-08-10 NOTE — ANESTHESIA POSTPROCEDURE EVALUATION
Post-Op Assessment Note    CV Status:  Stable  Pain Score: 0    Pain management: adequate     Mental Status:  Arousable   Hydration Status:  Stable   PONV Controlled:  None   Airway Patency:  Patent      Post Op Vitals Reviewed: Yes      Staff: Anesthesiologist, CRNA         No complications documented      /85 (08/10/20 1434)    Temp 97 7 °F (36 5 °C) (08/10/20 1434)    Pulse 87 (08/10/20 1434)   Resp 18 (08/10/20 1434)    SpO2 97 % (08/10/20 1434)

## 2020-08-10 NOTE — PROGRESS NOTES
INTERNAL MEDICINE RESIDENCY PROGRESS NOTE     Name: Tina Cordovarich Sites   Age & Sex: 22 y o  male   MRN: [id number]  Unit/Bed#: -01   Encounter: [id number]  Team: SOD Team A    PATIENT INFORMATION     Name: Tina Dai [facility]   Age & Sex: 22 y o  male   MRN: [id number]  Hospital Stay Days: 3    ASSESSMENT/PLAN     Principal Problem:    Calculus of common bile duct with obstruction  Active Problems:    Tobacco abuse    Obesity (BMI 30 0-34  9)    Transaminitis    Hyperbilirubinemia    Microcytosis      Hyperbilirubinemia  Assessment & Plan  Tbili 4 98, jaundice on arrival, trends: 3 67>4 37 today    Plan:  -continue to trend    Transaminitis  Assessment & Plan    on arrival and have trended down to 181/381  T Bili trending down from 4 98 to 3 67  HIV and Hep Panel negative    Plan:  · Likely 2/2 to cholestasis  · Continue to trend    Obesity (BMI 30 0-34  9)  Assessment & Plan  HgbA1C 5 6, Total cholesterol 198, , , HDL 20, TSH 0 98  · Educated on diet and lifestyle modifications    Tobacco abuse  Assessment & Plan  Current smoker, 1 5 ppd  Plan:  -smoking cessation counseling, nicotine patch deferred    * Calculus of common bile duct with obstruction  Assessment & Plan  1 month hx of LBP that has become constant in past 1-2 days, now with worsening RUQ/epigastric pain w/ dark urine/jaundice/NBNB emesis  AVSS on arrival  , , Alk-phos 188, Tbili 4 98, WBC 11 13  CT in ED demonstrated stone in gallbladder neck w/o acute cholecystitis, 2 calculi in distal CBD within head of pancreas w/o intrahepatic ductal dilatation   [facility] for likely ERCP, GI eval      Plan:  · continue Empiric IV ceftriaxone and flagyl (Day 4)  · trend LFTs and WBC  · Pain regimen ordered oxy 5mg for moderate pain oxy 10mg for severe pain, and dilaudid 1mg for breakthrough pain - has required 2 doses of dilaudid and 1 dose of oxy 10 over 24 hours   · PRN zofran  · Continue fluids - isolyte 100cc  · GI consulted - ERCP today  · General surgery consulted - recommending cholecystectomy after completion of ERCP     Leukocytosis-resolved as of 2020  Assessment & Plan  Mild leukocytosis on arrival, WBC 11 13 but has trended down to 7 21 today    Plan:  · Continue to trend      Disposition: Currently inpatient for choledocholithiasis with ERCP today  SUBJECTIVE     Patient seen and examined  No acute events overnight  Patient denies any fevers, chills, lightheadedness, chest pain, shortness of breath, nausea, vomiting, abdominal pain, leg swelling  Patient to have ERCP today  OBJECTIVE     Vitals:    20 0735 20 1555 20 2301 08/10/20 0709   BP: 124/76 124/78 125/79 123/75   Pulse: 75 58 55 65   Resp:  18 16 20   Temp: (!) 97 4 °F (36 3 °C) 97 7 °F (36 5 °C) 97 7 °F (36 5 °C) 97 8 °F (36 6 °C)   TempSrc:    Oral   SpO2: 95% 99% 94% 93%   Weight:       Height:          Temperature:   Temp (24hrs), Av 7 °F (36 5 °C), Min:97 7 °F (36 5 °C), Max:97 8 °F (36 6 °C)    Temperature: 97 8 °F (36 6 °C)  Intake & Output:  I/O        07 -  0700  07 - 08/10 0700 08/10 07 -  0700    P  O  420 1200 0    I V  (mL/kg) 1600 (15 2) 2345 (22 3)     IV Piggyback 150 150     Total Intake(mL/kg) 2170 (20 7) 3695 (35 2) 0 (0)    Urine (mL/kg/hr) 2600 (1) 2420 (1)     Emesis/NG output 2      Total Output 2602 2420     Net -432 +1275 0               Weights:   IBW: 77 6 kg    Body mass index is 31 54 kg/m²  Weight (last 2 days)     None        Physical Exam  LABORATORY DATA     Labs: I have personally reviewed pertinent reports    Results from last 7 days   Lab Units 08/10/20  0546 20  0555 20  0544 20  1915   WBC Thousand/uL 8 27 7 21 10 12 11 13*   HEMOGLOBIN g/dL 17 1* 15 5 15 7 16 9   HEMATOCRIT % 50 0* 46 1 46 7 48 4   PLATELETS Thousands/uL 205 177 204 233   NEUTROS PCT % 59  --   --  77*   MONOS PCT % 9  --   --  6      Results from last 7 days   Lab Units 08/10/20  0547 08/09/20  0555 08/08/20  0543   POTASSIUM mmol/L 4 4 4 0 4 2   CHLORIDE mmol/L 103 105 106   CO2 mmol/L 28 24 27   BUN mg/dL 10 8 10   CREATININE mg/dL 0 98 0 90 0 94   CALCIUM mg/dL 9 2 9 0 8 8   ALK PHOS U/L 188* 171* 173*   ALT U/L 379* 381* 422*   AST U/L 209* 181* 219*              Results from last 7 days   Lab Units 08/07/20  1915   INR  0 96   PTT seconds 27               IMAGING & DIAGNOSTIC TESTING     Radiology Results: I have personally reviewed pertinent reports  Ct Renal Stone Study Abdomen Pelvis Wo Contrast    Result Date: 8/7/2020  Impression: 1  Single gallstone in the gallbladder neck without evidence of acute cholecystitis  2  Two calculi in the distal common bile duct within the head of the pancreas measuring 3 and 6 mm  Minimal dilatation of the proximal common bile duct without intrahepatic bile duct dilatation  GI consultation, ERCP and/or MRCP may be helpful for further evaluation  3   No evidence of nephrolithiasis or obstructive uropathy  Workstation performed: [id number]     Other Diagnostic Testing: I have personally reviewed pertinent reports      ACTIVE MEDICATIONS     Current Facility-Administered Medications   Medication Dose Route Frequency    acetaminophen (TYLENOL) tablet 975 mg  975 mg Oral Q6H PRN    cefTRIAXone (ROCEPHIN) 2,000 mg in dextrose 5 % 50 mL IVPB  2,000 mg Intravenous Q24H    diphenhydrAMINE (BENADRYL) tablet 12 5 mg  12 5 mg Oral HS PRN    HYDROmorphone (DILAUDID) injection 1 mg  1 mg Intravenous Q3H PRN    melatonin tablet 3 mg  3 mg Oral HS    metroNIDAZOLE (FLAGYL) IVPB (premix) 500 mg 100 mL  500 mg Intravenous Q8H    multi-electrolyte (PLASMALYTE-A/ISOLYTE-S PH 7 4) IV solution  100 mL/hr Intravenous Continuous    ondansetron (ZOFRAN) injection 4 mg  4 mg Intravenous Q6H PRN    oxyCODONE (ROXICODONE) immediate release tablet 10 mg  10 mg Oral Q4H PRN    oxyCODONE (ROXICODONE) IR tablet 5 mg  5 mg Oral Q4H PRN VTE Pharmacologic Prophylaxis: Sequential compression device (Venodyne)   VTE Mechanical Prophylaxis: sequential compression device    Portions of the record may have been created with voice recognition software  Occasional wrong word or "sound a like" substitutions may have occurred due to the inherent limitations of voice recognition software    Read the chart carefully and recognize, using context, where substitutions have occurred   ==  Kannan Liang, [facility]  Internal Medicine Residency PGY-1

## 2020-08-10 NOTE — PLAN OF CARE
Problem: INFECTION - ADULT  Goal: Absence or prevention of progression during hospitalization  Description: INTERVENTIONS:  - Assess and monitor for signs and symptoms of infection  - Monitor lab/diagnostic results  - Monitor all insertion sites, i e  indwelling lines, tubes, and drains  - Monitor endotracheal if appropriate and nasal secretions for changes in amount and color  - [place] appropriate cooling/warming therapies per order  - Administer medications as ordered  - Instruct and encourage patient and family to use good hand hygiene technique  - Identify and instruct in appropriate isolation precautions for identified infection/condition  Outcome: Progressing  Goal: Absence of fever/infection during neutropenic period  Description: INTERVENTIONS:  - Monitor WBC    Outcome: Progressing

## 2020-08-10 NOTE — SOCIAL WORK
PT IS NOT A 30 DAYS READMISSION  PT IS NOT A BUNDLE  CM met with pt to discuss DCP and cm role  Pt lives with girlfriend in a 1sh with 4-5ste  Prior to admission pt was independent with ambulation and with ADLS  No prior hx of VNA  Pt's preference for pharmacy is [facility]  Pt denies any hx of drugs/ETOH or inpt psych stays  CM reviewed d/c planning process including the following: identifying help at home, patient preference for d/c planning needs, Discharge Lounge, [facility] Meds to Bed program, availability of treatment team to discuss questions or concerns patient and/or family may have regarding understanding medications and recognizing signs and symptoms once discharged  CM also encouraged patient to follow up with all recommended appointments after discharge  Patient advised of importance for patient and family to participate in managing patients medical well being

## 2020-08-10 NOTE — ANESTHESIA PREPROCEDURE EVALUATION
Procedure:  ERCP    Relevant Problems   CARDIO (within normal limits)      ENDO (within normal limits)      GI/HEPATIC (within normal limits)      /RENAL (within normal limits)      HEMATOLOGY (within normal limits)      MUSCULOSKELETAL (within normal limits)      NEURO/PSYCH (within normal limits)      PULMONARY (within normal limits)      Other   (+) Calculus of common bile duct with obstruction   (+) Hyperbilirubinemia   (+) Obesity (BMI 30 0-34 9)   (+) Tobacco abuse   (+) Transaminitis      Lab Results   Component Value Date    WBC 8 27 08/10/2020    HGB 17 1 (H) 08/10/2020    HCT 50 0 (H) 08/10/2020    MCV 84 08/10/2020     08/10/2020     Lab Results   Component Value Date    SODIUM 139 08/10/2020    K 4 4 08/10/2020     08/10/2020    CO2 28 08/10/2020    BUN 10 08/10/2020    CREATININE 0 98 08/10/2020    GLUC 85 08/10/2020    CALCIUM 9 2 08/10/2020     Lab Results   Component Value Date    INR 0 96 08/07/2020    PROTIME 12 6 08/07/2020     Lab Results   Component Value Date    HGBA1C 5 6 08/07/2020          Physical Exam    Airway    Mallampati score: II  TM Distance: >3 FB  Neck ROM: full     Dental   No notable dental hx     Cardiovascular  Cardiovascular exam normal    Pulmonary  Pulmonary exam normal     Other Findings        Anesthesia Plan  ASA Score- 2     Anesthesia Type- general with ASA Monitors  Additional Monitors:   Airway Plan: ETT  Plan Factors-Exercise tolerance (METS): >4 METS  Chart reviewed  Existing labs reviewed  Patient summary reviewed  Patient is a current smoker  Patient instructed to abstain from smoking on day of procedure  Patient did not smoke on day of surgery  Induction- intravenous  Postoperative Plan-   Planned trial extubation    Informed Consent- Anesthetic plan and risks discussed with patient  I personally reviewed this patient with the CRNA  Discussed and agreed on the Anesthesia Plan with the CRNA  [place]

## 2020-08-10 NOTE — PROGRESS NOTES
Progress Note - General Surgery   Reagan Sites 22 y o  male MRN: [id number]  Unit/Bed#: -01 Encounter: [id number]    Assessment:  25 M with choledocholithiasis  VSS, Afebrile    Plan: Will follow up ERCP today      Subjective/Objective     Subjective:   NAEO  Pain improving  Ready for procedure today  Objective:    Blood pressure 123/75, pulse 65, temperature 97 8 °F (36 6 °C), temperature source Oral, resp  rate 20, height 6' (1 829 m), weight 105 kg (232 lb 9 4 oz), SpO2 93 %  ,Body mass index is 31 54 kg/m²  Intake/Output Summary (Last 24 hours) at 8/10/2020 0914  Last data filed at 8/10/2020 3564  Gross per 24 hour   Intake 3215 03 ml   Output 2420 ml   Net 795 03 ml       Invasive Devices     Peripheral Intravenous Line            Peripheral IV 08/07/20 Left Antecubital 2 days    Peripheral IV 08/07/20 Right Antecubital 2 days                Physical Exam:   Gen:  Well-developed, well-nourished male in NAD  HEENT:   +scleral icterus  normocephalic, atraumatic  Neck supple  Trachea midline  Lungs: Normal respiratory effort on RA  Abd: soft, tender, distended  Positive Calderon sign  Skin: warm/ dry  Extremities: pulses 2+  Neuro: AxO x3      Results from last 7 days   Lab Units 08/10/20  0546 08/09/20  0555 08/08/20  0544   WBC Thousand/uL 8 27 7 21 10 12   HEMOGLOBIN g/dL 17 1* 15 5 15 7   HEMATOCRIT % 50 0* 46 1 46 7   PLATELETS Thousands/uL 205 177 204     Results from last 7 days   Lab Units 08/10/20  0547 08/09/20  0555 08/08/20  0543   POTASSIUM mmol/L 4 4 4 0 4 2   CHLORIDE mmol/L 103 105 106   CO2 mmol/L 28 24 27   BUN mg/dL 10 8 10   CREATININE mg/dL 0 98 0 90 0 94   CALCIUM mg/dL 9 2 9 0 8 8     Results from last 7 days   Lab Units 08/07/20  1915   INR  0 96   PTT seconds 27        I have personally reviewed pertinent films in PACS

## 2020-08-11 ENCOUNTER — ANESTHESIA (INPATIENT)
Dept: PERIOP | Facility: HOSPITAL | Age: 25
DRG: 419 | End: 2020-08-11
Payer: COMMERCIAL

## 2020-08-11 ENCOUNTER — ANESTHESIA EVENT (INPATIENT)
Dept: PERIOP | Facility: HOSPITAL | Age: 25
DRG: 419 | End: 2020-08-11
Payer: COMMERCIAL

## 2020-08-11 LAB
ABO GROUP BLD: NORMAL
ALBUMIN SERPL BCP-MCNC: 3.9 G/DL (ref 3.5–5)
ALP SERPL-CCNC: 174 U/L (ref 46–116)
ALT SERPL W P-5'-P-CCNC: 332 U/L (ref 12–78)
ANION GAP SERPL CALCULATED.3IONS-SCNC: 7 MMOL/L (ref 4–13)
AST SERPL W P-5'-P-CCNC: 124 U/L (ref 5–45)
BASOPHILS # BLD AUTO: 0.02 THOUSANDS/ΜL (ref 0–0.1)
BASOPHILS NFR BLD AUTO: 0 % (ref 0–1)
BILIRUB SERPL-MCNC: 1.72 MG/DL (ref 0.2–1)
BLD GP AB SCN SERPL QL: NEGATIVE
BUN SERPL-MCNC: 12 MG/DL (ref 5–25)
CALCIUM SERPL-MCNC: 9.1 MG/DL (ref 8.3–10.1)
CHLORIDE SERPL-SCNC: 104 MMOL/L (ref 100–108)
CO2 SERPL-SCNC: 28 MMOL/L (ref 21–32)
CREAT SERPL-MCNC: 1.05 MG/DL (ref 0.6–1.3)
EOSINOPHIL # BLD AUTO: 0.02 THOUSAND/ΜL (ref 0–0.61)
EOSINOPHIL NFR BLD AUTO: 0 % (ref 0–6)
ERYTHROCYTE [DISTWIDTH] IN BLOOD BY AUTOMATED COUNT: 13.1 % (ref 11.6–15.1)
GFR SERPL CREATININE-BSD FRML MDRD: 98 ML/MIN/1.73SQ M
GLUCOSE SERPL-MCNC: 109 MG/DL (ref 65–140)
HCT VFR BLD AUTO: 48.7 % (ref 36.5–49.3)
HGB BLD-MCNC: 16.3 G/DL (ref 12–17)
IMM GRANULOCYTES # BLD AUTO: 0.07 THOUSAND/UL (ref 0–0.2)
IMM GRANULOCYTES NFR BLD AUTO: 1 % (ref 0–2)
LYMPHOCYTES # BLD AUTO: 2 THOUSANDS/ΜL (ref 0.6–4.47)
LYMPHOCYTES NFR BLD AUTO: 14 % (ref 14–44)
MCH RBC QN AUTO: 28.1 PG (ref 26.8–34.3)
MCHC RBC AUTO-ENTMCNC: 33.5 G/DL (ref 31.4–37.4)
MCV RBC AUTO: 84 FL (ref 82–98)
MONOCYTES # BLD AUTO: 0.71 THOUSAND/ΜL (ref 0.17–1.22)
MONOCYTES NFR BLD AUTO: 5 % (ref 4–12)
NEUTROPHILS # BLD AUTO: 11.58 THOUSANDS/ΜL (ref 1.85–7.62)
NEUTS SEG NFR BLD AUTO: 80 % (ref 43–75)
NRBC BLD AUTO-RTO: 0 /100 WBCS
PLATELET # BLD AUTO: 224 THOUSANDS/UL (ref 149–390)
PMV BLD AUTO: 9.3 FL (ref 8.9–12.7)
POTASSIUM SERPL-SCNC: 4.3 MMOL/L (ref 3.5–5.3)
PROT SERPL-MCNC: 7.7 G/DL (ref 6.4–8.2)
RBC # BLD AUTO: 5.8 MILLION/UL (ref 3.88–5.62)
RH BLD: POSITIVE
SODIUM SERPL-SCNC: 139 MMOL/L (ref 136–145)
SPECIMEN EXPIRATION DATE: NORMAL
WBC # BLD AUTO: 14.4 THOUSAND/UL (ref 4.31–10.16)

## 2020-08-11 PROCEDURE — 86900 BLOOD TYPING SEROLOGIC ABO: CPT | Performed by: SURGERY

## 2020-08-11 PROCEDURE — 85025 COMPLETE CBC W/AUTO DIFF WBC: CPT | Performed by: STUDENT IN AN ORGANIZED HEALTH CARE EDUCATION/TRAINING PROGRAM

## 2020-08-11 PROCEDURE — 99232 SBSQ HOSP IP/OBS MODERATE 35: CPT | Performed by: SURGERY

## 2020-08-11 PROCEDURE — 88304 TISSUE EXAM BY PATHOLOGIST: CPT | Performed by: PATHOLOGY

## 2020-08-11 PROCEDURE — 0FT44ZZ RESECTION OF GALLBLADDER, PERCUTANEOUS ENDOSCOPIC APPROACH: ICD-10-PCS | Performed by: SURGERY

## 2020-08-11 PROCEDURE — 47562 LAPAROSCOPIC CHOLECYSTECTOMY: CPT | Performed by: SURGERY

## 2020-08-11 PROCEDURE — 99232 SBSQ HOSP IP/OBS MODERATE 35: CPT | Performed by: INTERNAL MEDICINE

## 2020-08-11 PROCEDURE — 80053 COMPREHEN METABOLIC PANEL: CPT | Performed by: STUDENT IN AN ORGANIZED HEALTH CARE EDUCATION/TRAINING PROGRAM

## 2020-08-11 PROCEDURE — NC001 PR NO CHARGE: Performed by: SURGERY

## 2020-08-11 PROCEDURE — 86850 RBC ANTIBODY SCREEN: CPT | Performed by: SURGERY

## 2020-08-11 PROCEDURE — 86901 BLOOD TYPING SEROLOGIC RH(D): CPT | Performed by: SURGERY

## 2020-08-11 RX ORDER — HYDROMORPHONE HCL/PF 1 MG/ML
0.4 SYRINGE (ML) INJECTION
Status: DISCONTINUED | OUTPATIENT
Start: 2020-08-11 | End: 2020-08-11 | Stop reason: HOSPADM

## 2020-08-11 RX ORDER — SUCCINYLCHOLINE/SOD CL,ISO/PF 100 MG/5ML
SYRINGE (ML) INTRAVENOUS AS NEEDED
Status: DISCONTINUED | OUTPATIENT
Start: 2020-08-11 | End: 2020-08-11

## 2020-08-11 RX ORDER — MAGNESIUM HYDROXIDE 1200 MG/15ML
LIQUID ORAL AS NEEDED
Status: DISCONTINUED | OUTPATIENT
Start: 2020-08-11 | End: 2020-08-11 | Stop reason: HOSPADM

## 2020-08-11 RX ORDER — DEXAMETHASONE SODIUM PHOSPHATE 4 MG/ML
INJECTION, SOLUTION INTRA-ARTICULAR; INTRALESIONAL; INTRAMUSCULAR; INTRAVENOUS; SOFT TISSUE AS NEEDED
Status: DISCONTINUED | OUTPATIENT
Start: 2020-08-11 | End: 2020-08-11

## 2020-08-11 RX ORDER — FENTANYL CITRATE/PF 50 MCG/ML
25 SYRINGE (ML) INJECTION
Status: DISCONTINUED | OUTPATIENT
Start: 2020-08-11 | End: 2020-08-11 | Stop reason: HOSPADM

## 2020-08-11 RX ORDER — ESMOLOL HYDROCHLORIDE 10 MG/ML
INJECTION INTRAVENOUS AS NEEDED
Status: DISCONTINUED | OUTPATIENT
Start: 2020-08-11 | End: 2020-08-11

## 2020-08-11 RX ORDER — ROCURONIUM BROMIDE 10 MG/ML
INJECTION, SOLUTION INTRAVENOUS AS NEEDED
Status: DISCONTINUED | OUTPATIENT
Start: 2020-08-11 | End: 2020-08-11

## 2020-08-11 RX ORDER — ONDANSETRON 2 MG/ML
4 INJECTION INTRAMUSCULAR; INTRAVENOUS ONCE AS NEEDED
Status: DISCONTINUED | OUTPATIENT
Start: 2020-08-11 | End: 2020-08-11 | Stop reason: HOSPADM

## 2020-08-11 RX ORDER — FENTANYL CITRATE 50 UG/ML
INJECTION, SOLUTION INTRAMUSCULAR; INTRAVENOUS AS NEEDED
Status: DISCONTINUED | OUTPATIENT
Start: 2020-08-11 | End: 2020-08-11

## 2020-08-11 RX ORDER — CEFAZOLIN SODIUM 2 G/50ML
SOLUTION INTRAVENOUS AS NEEDED
Status: DISCONTINUED | OUTPATIENT
Start: 2020-08-11 | End: 2020-08-11

## 2020-08-11 RX ORDER — PROMETHAZINE HYDROCHLORIDE 25 MG/ML
25 INJECTION, SOLUTION INTRAMUSCULAR; INTRAVENOUS ONCE AS NEEDED
Status: DISCONTINUED | OUTPATIENT
Start: 2020-08-11 | End: 2020-08-11 | Stop reason: HOSPADM

## 2020-08-11 RX ORDER — SODIUM CHLORIDE, SODIUM LACTATE, POTASSIUM CHLORIDE, CALCIUM CHLORIDE 600; 310; 30; 20 MG/100ML; MG/100ML; MG/100ML; MG/100ML
INJECTION, SOLUTION INTRAVENOUS CONTINUOUS PRN
Status: DISCONTINUED | OUTPATIENT
Start: 2020-08-11 | End: 2020-08-11

## 2020-08-11 RX ADMIN — HYDROMORPHONE HYDROCHLORIDE 0.5 MG: 1 INJECTION, SOLUTION INTRAMUSCULAR; INTRAVENOUS; SUBCUTANEOUS at 10:45

## 2020-08-11 RX ADMIN — MELATONIN 3 MG: at 21:02

## 2020-08-11 RX ADMIN — Medication 25 MCG: at 11:55

## 2020-08-11 RX ADMIN — ESMOLOL HYDROCHLORIDE 10 MG: 100 INJECTION, SOLUTION INTRAVENOUS at 10:46

## 2020-08-11 RX ADMIN — CEFAZOLIN SODIUM 2000 MG: 2 SOLUTION INTRAVENOUS at 10:13

## 2020-08-11 RX ADMIN — SODIUM CHLORIDE, SODIUM GLUCONATE, SODIUM ACETATE, POTASSIUM CHLORIDE AND MAGNESIUM CHLORIDE 100 ML/HR: 526; 502; 368; 37; 30 INJECTION, SOLUTION INTRAVENOUS at 15:01

## 2020-08-11 RX ADMIN — FENTANYL CITRATE 100 MCG: 50 INJECTION, SOLUTION INTRAMUSCULAR; INTRAVENOUS at 10:12

## 2020-08-11 RX ADMIN — Medication 500 MG: at 10:20

## 2020-08-11 RX ADMIN — SUGAMMADEX 300 MG: 100 INJECTION, SOLUTION INTRAVENOUS at 11:27

## 2020-08-11 RX ADMIN — ONDANSETRON 4 MG: 2 INJECTION INTRAMUSCULAR; INTRAVENOUS at 11:13

## 2020-08-11 RX ADMIN — ROCURONIUM BROMIDE 30 MG: 10 INJECTION, SOLUTION INTRAVENOUS at 10:16

## 2020-08-11 RX ADMIN — METRONIDAZOLE 500 MG: 500 INJECTION, SOLUTION INTRAVENOUS at 18:29

## 2020-08-11 RX ADMIN — OXYCODONE HYDROCHLORIDE 10 MG: 10 TABLET ORAL at 12:56

## 2020-08-11 RX ADMIN — HYDROMORPHONE HYDROCHLORIDE 0.5 MG: 1 INJECTION, SOLUTION INTRAMUSCULAR; INTRAVENOUS; SUBCUTANEOUS at 11:27

## 2020-08-11 RX ADMIN — SODIUM CHLORIDE, SODIUM GLUCONATE, SODIUM ACETATE, POTASSIUM CHLORIDE AND MAGNESIUM CHLORIDE 100 ML/HR: 526; 502; 368; 37; 30 INJECTION, SOLUTION INTRAVENOUS at 00:23

## 2020-08-11 RX ADMIN — ROCURONIUM BROMIDE 20 MG: 10 INJECTION, SOLUTION INTRAVENOUS at 10:46

## 2020-08-11 RX ADMIN — HYDROMORPHONE HYDROCHLORIDE 0.5 MG: 1 INJECTION, SOLUTION INTRAMUSCULAR; INTRAVENOUS; SUBCUTANEOUS at 10:22

## 2020-08-11 RX ADMIN — Medication 25 MCG: at 12:02

## 2020-08-11 RX ADMIN — SODIUM CHLORIDE, SODIUM LACTATE, POTASSIUM CHLORIDE, AND CALCIUM CHLORIDE: .6; .31; .03; .02 INJECTION, SOLUTION INTRAVENOUS at 10:10

## 2020-08-11 RX ADMIN — SODIUM CHLORIDE, SODIUM GLUCONATE, SODIUM ACETATE, POTASSIUM CHLORIDE AND MAGNESIUM CHLORIDE 100 ML/HR: 526; 502; 368; 37; 30 INJECTION, SOLUTION INTRAVENOUS at 08:34

## 2020-08-11 RX ADMIN — CEFTRIAXONE SODIUM 2000 MG: 2 INJECTION, POWDER, FOR SOLUTION INTRAMUSCULAR; INTRAVENOUS at 05:52

## 2020-08-11 RX ADMIN — ROCURONIUM BROMIDE 50 MG: 10 INJECTION, SOLUTION INTRAVENOUS at 11:02

## 2020-08-11 RX ADMIN — Medication 100 MG: at 10:12

## 2020-08-11 RX ADMIN — OXYCODONE HYDROCHLORIDE 10 MG: 10 TABLET ORAL at 20:56

## 2020-08-11 RX ADMIN — METRONIDAZOLE 500 MG: 500 INJECTION, SOLUTION INTRAVENOUS at 03:00

## 2020-08-11 RX ADMIN — DEXAMETHASONE SODIUM PHOSPHATE 4 MG: 4 INJECTION, SOLUTION INTRAMUSCULAR; INTRAVENOUS at 10:33

## 2020-08-11 NOTE — ANESTHESIA PREPROCEDURE EVALUATION
Procedure:  CHOLECYSTECTOMY LAPAROSCOPIC (N/A Abdomen)    Relevant Problems   CARDIO (within normal limits)      ENDO (within normal limits)      GI/HEPATIC  Appropriately NPo           /RENAL (within normal limits)      HEMATOLOGY (within normal limits)      MUSCULOSKELETAL (within normal limits)      NEURO/PSYCH (within normal limits)      PULMONARY (within normal limits)      Physical Exam    Airway    Mallampati score: II  TM Distance: >3 FB  Neck ROM: full     Dental   No notable dental hx     Cardiovascular  Cardiovascular exam normal    Pulmonary  Pulmonary exam normal     Other Findings        Anesthesia Plan  ASA Score- 2     Anesthesia Type- general with ASA Monitors  Additional Monitors:   Airway Plan: ETT  Plan Factors-Exercise tolerance (METS): >4 METS  Chart reviewed  Existing labs reviewed  Patient summary reviewed  Patient is a current smoker  Patient instructed to abstain from smoking on day of procedure  Patient did not smoke on day of surgery  Induction- intravenous and rapid sequence induction  Postoperative Plan- Plan for postoperative opioid use  Planned trial extubation    Informed Consent- Anesthetic plan and risks discussed with patient

## 2020-08-11 NOTE — PROGRESS NOTES
Post- OP Note - General Surgery   Reagan Sites 22 y o  male MRN: [id number]  Unit/Bed#: -01 Encounter: [id number]    Assessment:  25M with choledocholithiasis s/p ERCP with sphincterotomy, CBD stone(s) removal 8/10 and s/p lap randy 8/11    VSS, Afebrile    Plan:  Clear Liquids  Pain Control  IVF  Monitor UOP       Subjective/Objective     Subjective:   Patient alert and oriented  Experiencing some nausea  No vomiting  Has not been OOB yet  Experience post-op abdominal pain  No chest pains or shortness of breath  Objective:    Blood pressure 134/68, pulse 70, temperature 97 6 °F (36 4 °C), resp  rate 17, height 6' (1 829 m), weight 105 kg (232 lb 9 4 oz), SpO2 96 %  ,Body mass index is 31 54 kg/m²  Intake/Output Summary (Last 24 hours) at 8/11/2020 1241  Last data filed at 8/11/2020 6966  Gross per 24 hour   Intake 2206 67 ml   Output 2820 ml   Net -613 33 ml       Invasive Devices     Peripheral Intravenous Line            Peripheral IV 08/07/20 Left Antecubital 3 days    Peripheral IV 08/11/20 Right Antecubital less than 1 day                Physical Exam:   Gen:  Well-developed, well-nourished male in NAD  HEENT: normocephalic, atraumatic  neck supple, trachea midline  CV: RRR  Lungs: Normal respiratory effort  Abd: soft, mildly tender, nondistended, Incisions clean dry and intact  Skin: warm/ dry  Neuro:  AxO x3      Results from last 7 days   Lab Units 08/11/20  0540 08/10/20  0546 08/09/20  0555   WBC Thousand/uL 14 40* 8 27 7 21   HEMOGLOBIN g/dL 16 3 17 1* 15 5   HEMATOCRIT % 48 7 50 0* 46 1   PLATELETS Thousands/uL 224 205 177     Results from last 7 days   Lab Units 08/11/20  0540 08/10/20  0547 08/09/20  0555   POTASSIUM mmol/L 4 3 4 4 4 0   CHLORIDE mmol/L 104 103 105   CO2 mmol/L 28 28 24   BUN mg/dL 12 10 8   CREATININE mg/dL 1 05 0 98 0 90   CALCIUM mg/dL 9 1 9 2 9 0     Results from last 7 days   Lab Units 08/07/20  1915   INR  0 96   PTT seconds 27

## 2020-08-11 NOTE — DISCHARGE INSTRUCTIONS
1  Follow up with General Surgery- Dr Usmaa Lowry in 2 weeks, please call office to set up appointment  2  Can shower day 2  No baths, hot tubs or swimming pools until follow up  3  Other activity as tolerated    Laparoscopic Cholecystectomy   WHAT YOU NEED TO KNOW:   Laparoscopic cholecystectomy is surgery to remove your gallbladder  DISCHARGE INSTRUCTIONS:   Medicines: You may need any of the following:  · Prescription pain medicine  helps decrease pain  Do not wait until the pain is severe before you take this medicine  · NSAIDs  decrease swelling and pain  This medicine can be bought with or without a doctor's order  This medicine can cause stomach bleeding or kidney problems in certain people  If you take blood thinner medicine, always ask your healthcare provider if NSAIDs are safe for you  Read the medicine label and follow the directions on it before using this medicine  · Take your medicine as directed  Contact your healthcare provider if you think your medicine is not helping or if you have side effects  Tell him or her if you are allergic to any medicine  Keep a list of the medicines, vitamins, and herbs you take  Include the amounts, and when and why you take them  Bring the list or the pill bottles to follow-up visits  Carry your medicine list with you in case of an emergency  Follow up with your healthcare provider 2 weeks after surgery, or as directed:  Write down your questions so you remember to ask them during your visits  Wound care:  Care for your surgical wounds as directed  Keep the wounds clean and dry  You may take a shower the day after your surgery  What to eat after surgery:  Eat low-fat foods for 4 to 6 weeks while your body learns to digest fat without a gallbladder  Slowly increase the amount of fat that you eat  Drink plenty of liquids  Ask how much liquid to drink and which liquids are best for you  When to return to work and other activities:   You may return to work or other activities as soon as your pain is controlled and you feel comfortable  For many people, this is 5 to 7 days after surgery  Contact your healthcare provider if:   · You have a fever over 101°F (38°C) or chills  · You have pain or nausea that is not relieved by medicine  · You have redness and swelling around your incisions, or blood or pus is leaking from your incisions  · You are constipated or have diarrhea  · Your skin or eyes are yellow, or your bowel movements are pale  · You have questions or concerns about your surgery, condition, or care  Seek care immediately or call 911 if:   · You cannot stop vomiting  · Your bowel movements are black or bloody  · You have pain in your abdomen and it is swollen or hard  · Your arm or leg feels warm, tender, and painful  It may look swollen and red  · You feel lightheaded, short of breath, and have chest pain  · You cough up blood  © 2017 [employer]  Information is for End User's use only and may not be sold, redistributed or otherwise used for commercial purposes  All illustrations and images included in CareNotes® are the copyrighted property of [employer]  or Ronald Oconnor  The above information is an  only  It is not intended as medical advice for individual conditions or treatments  Talk to your doctor, nurse or pharmacist before following any medical regimen to see if it is safe and effective for you

## 2020-08-11 NOTE — DISCHARGE INSTR - AVS FIRST PAGE
Call [facility] for PCP or Call [address] for PCP appointment in 1 week  Please obtain K[facility] imaging 1 week after discharge  Call and schedule an appointment with General Surgery in 2 weeks     Please obtain blood work before PCP follow-up

## 2020-08-11 NOTE — UTILIZATION REVIEW
Continued Stay Review    Date: 08/09/2020                          Current Patient Class: Inpatient  Current Level of Care: Med/Surg    HPI:25 y o  male initially admitted on 08/07/2020   Calculus of common bile duct with obstruction  Assessment/Plan:   08/09/2020:  1 month hx of LBP that has become constant in past 1-2 days, now with worsening RUQ/epigastric pain w/ dark urine/jaundice/NBNB emesis  AVSS on arrival  , , Alk-phos 188, Tbili 4 98, WBC 11 13  CT in ED demonstrated stone in gallbladder neck w/o acute cholecystitis, 2 calculi in distal CBD within head of pancreas w/o intrahepatic ductal dilatation  Transfer from [facility] to [facility] for likely ERCP, GI eval  Plan:  continue Empiric IV ceftriaxone and flagyl (Day 3)  trend LFTs and WBC  Pain regimen ordered oxy 5mg for moderate pain oxy 10mg for severe pain, and dilaudid 1mg for breakthrough pain - has required 2 doses of dilaudid and 1 dose of oxy 10 over 24 hours  PRN zofran  Continue fluids - isolyte 100cc  GI consulted - ERCP will likely be done Monday  General surgery consulted - recommending cholecystectomy after completion of ERCP       08/10/2020:  continue Empiric IV ceftriaxone and flagyl (Day 4)  trend LFTs and WBC  Pain regimen ordered oxy 5mg for moderate pain oxy 10mg for severe pain, and dilaudid 1mg for breakthrough pain - has required 2 doses of dilaudid and 1 dose of oxy 10 over 24 hours  PRN zofran  Continue fluids - isolyte 100cc  GI consulted - ERCP today ( see results below)  General surgery consulted - recommending cholecystectomy after completion of ERCP     08/11/2020:  NPO since midnight, continue Isolyte @ 100    Plan for OR for lap cholecystectomy    Pertinent Labs/Diagnostic Results:   Results from last 7 days   Lab Units 08/09/20  1522   SARS-COV-2  Negative     Results from last 7 days   Lab Units 08/11/20  0540 08/10/20  0546 08/09/20  0555 08/08/20  0544 08/07/20  1915   WBC Thousand/uL 14 40* 8 27 7 21 10 12 11 13*   HEMOGLOBIN g/dL 16 3 17 1* 15 5 15 7 16 9   HEMATOCRIT % 48 7 50 0* 46 1 46 7 48 4   PLATELETS Thousands/uL 224 205 177 204 233   NEUTROS ABS Thousands/µL 11 58* 4 90  --   --  8 61*     Results from last 7 days   Lab Units 08/11/20  0540 08/10/20  0547 08/09/20  0555 08/08/20  0543 08/07/20  1915   SODIUM mmol/L 139 139 139 140 140   POTASSIUM mmol/L 4 3 4 4 4 0 4 2 4 1   CHLORIDE mmol/L 104 103 105 106 101   CO2 mmol/L 28 28 24 27 30   ANION GAP mmol/L 7 8 10 7 9   BUN mg/dL 12 10 8 10 10   CREATININE mg/dL 1 05 0 98 0 90 0 94 1 13   EGFR ml/min/1 73sq m 98 107 118 112 90   CALCIUM mg/dL 9 1 9 2 9 0 8 8 9 4     Results from last 7 days   Lab Units 08/11/20  0540 08/10/20  0547 08/09/20  0555 08/08/20  0543 08/07/20 1915   AST U/L 124* 209* 181* 219* 309*   ALT U/L 332* 379* 381* 422* 545*   ALK PHOS U/L 174* 188* 171* 173* 188*   TOTAL PROTEIN g/dL 7 7 7 3 6 6 6 6 7 6   ALBUMIN g/dL 3 9 3 8 3 4* 3 5 4 3   TOTAL BILIRUBIN mg/dL 1 72* 4 32* 3 67* 4 26* 4 98*     Results from last 7 days   Lab Units 08/11/20  0540 08/10/20  0547 08/09/20  0555 08/08/20  0543 08/07/20 1915   GLUCOSE RANDOM mg/dL 109 85 93 112 109     Results from last 7 days   Lab Units 08/07/20  1915   HEMOGLOBIN A1C % 5 6   EAG mg/dl 114     Results from last 7 days   Lab Units 08/07/20 1915   CK TOTAL U/L 75     Results from last 7 days   Lab Units 08/07/20 1915   PROTIME seconds 12 6   INR  0 96   PTT seconds 27     Results from last 7 days   Lab Units 08/07/20  1915   TSH 3RD GENERATON uIU/mL 0 980     Results from last 7 days   Lab Units 08/08/20  0543   HEP B S AG  Non-reactive   HEP C AB  Non-reactive   HEP B C IGM  Non-reactive   HEP B C TOTAL AB  Non-reactive     Results from last 7 days   Lab Units 08/09/20  0555 08/07/20 1915   LIPASE u/L 124 183     Results from last 7 days   Lab Units 08/07/20 1919   CLARITY UA  Clear   COLOR UA  [place]   SPEC GRAV UA  1 015   PH UA  8 5*   GLUCOSE UA mg/dl Negative   KETONES UA mg/dl 15 (1+)*   BLOOD UA  Negative   PROTEIN UA mg/dl 30 (1+)*   NITRITE UA  Negative   BILIRUBIN UA  Large*   UROBILINOGEN UA E U /dl 1 0   LEUKOCYTES UA  Negative   WBC UA /hpf 0-1*   RBC UA /hpf 0-1*   BACTERIA UA /hpf Moderate*   EPITHELIAL CELLS WET PREP /hpf Occasional   MUCUS THREADS  Occasional*     Results from last 7 days   Lab Units 08/07/20 2027 08/07/20 2026   BLOOD CULTURE  No Growth at 48 hrs  No Growth at 48 hrs       Vital Signs:   Date/Time   Temp   Pulse   Resp   BP   MAP (mmHg)   SpO2   O2 Flow Rate (L/min)   O2 Device   Patient Position - Orthostatic VS    08/11/20 07:09:58   98 °F (36 7 °C)   67   18   114/73   87   97 %   --   --   Lying    08/10/20 23:27:41   98 2 °F (36 8 °C)   70   16   118/65   83   95 %   --   --   --    08/10/20 1800   --   75   --   --   --   93 %   --   --   --    08/10/20 15:28:47   97 9 °F (36 6 °C)   68   16   120/66   84   94 %   --   --   --    08/10/20 1458   --   80   18   131/85   --   96 %   --   None (Room air)   --    08/10/20 1444   --   91   18   148/97   --   97 %   --   None (Room air)   --    08/10/20 1439   --   81   18   127/83   --   96 %   --   None (Room air)   --    08/10/20 1434   97 7 °F (36 5 °C)   87   18   137/85   --   97 %   4 L/min   Simple mask   --    08/10/20 1258   98 3 °F (36 8 °C)   67   18   132/81   --   98 %   --   None (Room air)   --    08/10/20 07:09:17   97 8 °F (36 6 °C)   65   20   123/75   91   93 %   --   None (Room air)   --    08/09/20 23:01:59   97 7 °F (36 5 °C)   55   16   125/79   94   94 %   --   --   --    08/09/20 15:55:37   97 7 °F (36 5 °C)   58   18   124/78   93   99 %   --   None (Room air)   --    08/09/20 07:35:03   97 4 °F (36 3 °C)Abnormal     75   --   124/76   92   95 %   --   None (Room air)   --    08/09/20 0731   --   --   --   --   --   94 %   --   None (Room air)   --      Date and Time  Eye Opening  Best Verbal Response  Best Motor Response  Yoli Coma Scale Score    08/11/20 0000  4  5  6  15 08/10/20 0340  4  5  6  15      08/10/2020 @ 1530  ERCP:  ERCP with sphincterotomy, extraction of choledocholithiasis, and placement of pancreatic duct stent  FINDINGS:  · Side viewing duodenoscope was advanced to the descending duodenum with limited views of the esophagus, stomach, and duodenum  · There did appear to be two different openings to the bile duct/pancreatic duct from the ampulla  The smaller opening lead to cannulation of the pancreatic duct  · Inadvertently cannulated the pancreatic duct from the larger orifice  · Deeply cannulated the common bile duct after 3 attempts by employing double guidewire technique and using a traction sphincterotome  Used 260 cm x 0 025 in straight guidewire  Cannulation was not difficult  Injected contrast  · Small filling defects in the common hepatic duct  · 6 mm generalized dilation in the common bile duct  · Performed medium major papilla sphincterotomy using a sphincterotome  No bleeding at the procedure site  · Completely removed stones with 12 mm balloon in multiple sweeps in the proximal common bile duct  · Occlusion cholangiogram showed no further filling defects  · Placed plastic, straight stent with length of 3 cm and diameter of 5 Fr in the pancreatic duct  · Final fluoroscopic images showed no air in unusual places  RECOMMENDATION:  - Abdominal x-ray/KUB in 7-10 days to determine if pancreatic duct stent has spontaneously passed   If this remains will need upper endoscopy to remove the stent  - Follow LFTs  - Cholecystectomy per surgical service      Medications:   Scheduled Medications:  cefazolin, 2,000 mg, Intravenous, On Call To OR  cefTRIAXone, 2,000 mg, Intravenous, Q24H  melatonin, 3 mg, Oral, HS  metroNIDAZOLE, 500 mg, Intravenous, Q8H    Continuous IV Infusions:  multi-electrolyte, 100 mL/hr, Intravenous, Continuous    PRN Meds:  acetaminophen, 975 mg, Oral, Q6H PRN  diphenhydrAMINE, 12 5 mg, Oral, HS PRN  HYDROmorphone, 1 mg, Intravenous, Q3H PRN  ondansetron, 4 mg, Intravenous, Q6H PRN  oxyCODONE, 10 mg, Oral, Q4H PRN  oxyCODONE, 5 mg, Oral, Q4H PRN    Discharge Plan: D    Network Utilization Review Department  [email or web address]  ATTENTION: Please call with any questions or concerns to [phone] and carefully listen to the prompts so that you are directed to the right person  All voicemails are confidential   Jemima Kaiser all requests for admission clinical reviews, approved or denied determinations and any other requests to dedicated fax number below belonging to the campus where the patient is receiving treatment   List of dedicated fax numbers for the Facilities:  [address] DENIALS (Administrative/Medical Necessity) [phone]   [address] (Maternity/NICU/Pediatrics) [phone]   Jose Rafael Edwardsi [phone]   Justin Moles [phone]   Andrae Denton [phone]   Bonita Yee [phone]   [address] [facility] [address] [place] [address] [phone]   [facility]  [phone]   [address] [facility] [address]  [address] [facility] [address] [facility] [phone]

## 2020-08-11 NOTE — ANESTHESIA POSTPROCEDURE EVALUATION
Post-Op Assessment Note    CV Status:  Stable    Pain management: adequate     Mental Status:  Alert   Hydration Status:  Stable   PONV Controlled:  None   Airway Patency:  Patent      Post Op Vitals Reviewed: Yes      Staff: Anesthesiologist         No complications documented      BP   143/83   Temp   97 6   Pulse  75   Resp   10   SpO2   98

## 2020-08-11 NOTE — OP NOTE
OPERATIVE REPORT  PATIENT NAME: Jonelle Stout    :  1995  MRN: [id number]  Pt Location: BE OR ROOM 07    SURGERY DATE: 2020    Surgeon(s) and Role:     * Andrea Evans MD - Primary     * Hitesh Blankenship DO - Assisting    Preop Diagnosis:  Calculus of common bile duct with obstruction [K80 51]    Post-Op Diagnosis Codes:     * Calculus of common bile duct with obstruction [K80 51]    Procedure(s) (LRB):  CHOLECYSTECTOMY LAPAROSCOPIC (N/A)    Specimen(s):  ID Type Source Tests Collected by Time Destination   1 : Gallbladder Tissue Gallbladder TISSUE EXAM Andrea Evans MD 2020 10:58 AM        Estimated Blood Loss:   Minimal    Drains:  * No LDAs found *    Anesthesia Type:   General    Operative Indications:  Calculus of common bile duct with obstruction [K80 51]      Operative Findings:  Intraoperative findings consistent with cholecystitis    Complications:   None    Procedure and Technique:  Patient was identified in preoperative holding area by name, date of birth, MRN  He was then brought to the operating suite and placed on operating table in supine position  General anesthesia was then induced  The area was prepped and draped in usual sterile fashion, and the appropriate time-out was called  Preoperative antibiotics were infused  The patient had an existing small umbilical hernia  Local anesthetic was infiltrated surrounding our plan umbilical hernia  Using a 11  Blade a 5 mm skin incision was made  The incision was then probed using hemostats and found to be in connection with the peritoneal cavity  A 5 trocar was then placed through this incision into the abdominal cavity, the abdomen was then insufflated to 15 mmHg  The camera was then inserted through the 5 port and the underlying bowel was inspected, no injury was identified    An 11 mm port was placed in the epigastric region, a 5 mm port was placed in the right upper quadrant midclavicular line, and a 3rd 5 mm port placed in the right lower quadrant anterior axillary line, all under direct vision  The gallbladder was then grasped with gallbladder graspers and retracted cephalad and laterally exposing Calot's triangle  Fatty adhesions from the gallbladder to the duodenum were taken down bluntly, the peritoneum was incised on either side of the gallbladder  The cystic duct and cystic artery were then dissected out using blunt dissection  Critical view of safety was obtained, 2 structures and only 2 structures were seen entering directly into the gallbladder  Both cystic artery and cystic duct were doubly clipped and transected  The gallbladder was then removed from the gallbladder fossa using electrocautery  The gallbladder fossa was then irrigated with saline and hemostasis was controlled using electrocautery  The gallbladder was removed using an Endo-Catch bag through the 11 mm epigastric port  The epigastric port was closed with 0 Vicryl using a Kerney  needle  Other ports removed under direct vision and the abdomen was desufflated  Skin was closed using 4 Monocryl and Dermabond was applied over top  The patient was taken to PACU in stable condition  He tolerated procedure well  Dr Ellen Hawkins was present for the entire duration of the procedure            Patient Disposition:  PACU     SIGNATURE: Harman Foster DO  DATE: August 11, 2020  TIME: 11:44 AM

## 2020-08-11 NOTE — PROGRESS NOTES
Progress Note - General Surgery   Reagan Sites 22 y o  male MRN: [id number]  Unit/Bed#: -01 Encounter: [id number]    Assessment:  22 M with choledocholithiasis s/p ERCP w/ sphincterotomy (8/10)    Plan:  · NPO since midnight, continue Isolyte @ 100  · Plan for OR for lap cholecystectomy  · Currently on CTX/Flagyl  · TBili downtrending  · DVT ppx  · Care per primary    Subjective/Objective     Subjective:   No acute events  Objective:    Blood pressure 114/73, pulse 67, temperature 98 °F (36 7 °C), temperature source Oral, resp  rate 18, height 6' (1 829 m), weight 105 kg (232 lb 9 4 oz), SpO2 97 %  ,Body mass index is 31 54 kg/m²  Intake/Output Summary (Last 24 hours) at 8/11/2020 0728  Last data filed at 8/11/2020 0522  Gross per 24 hour   Intake 1310 ml   Output 3820 ml   Net -2510 ml       Invasive Devices     Peripheral Intravenous Line            Peripheral IV 08/07/20 Left Antecubital 3 days    Peripheral IV 08/07/20 Right Antecubital 3 days                Physical Exam:   GEN: NAD  HEENT: MMM  CV: warm/well perfused  Lung: normal effort  Ab: Soft, NT /ND  Extrem: No CCE  Neuro: A+Ox3, motor and sensation grossly intact      Results from last 7 days   Lab Units 08/11/20  0540 08/10/20  0546 08/09/20  0555   WBC Thousand/uL 14 40* 8 27 7 21   HEMOGLOBIN g/dL 16 3 17 1* 15 5   HEMATOCRIT % 48 7 50 0* 46 1   PLATELETS Thousands/uL 224 205 177       Results from last 7 days   Lab Units 08/07/20  1915   INR  0 96   PTT seconds 27      Lab Results   Component Value Date    SODIUM 139 08/11/2020    K 4 3 08/11/2020     08/11/2020    CO2 28 08/11/2020    AGAP 7 08/11/2020    BUN 12 08/11/2020    CREATININE 1 05 08/11/2020    GLUC 109 08/11/2020    CALCIUM 9 1 08/11/2020     (H) 08/11/2020     (H) 08/11/2020    ALKPHOS 174 (H) 08/11/2020    TP 7 7 08/11/2020    TBILI 1 72 (H) 08/11/2020    EGFR 98 08/11/2020       I have personally reviewed pertinent films in PACS

## 2020-08-11 NOTE — PROGRESS NOTES
INTERNAL MEDICINE RESIDENCY PROGRESS NOTE     Name: Ty Stout   Age & Sex: 22 y o  male   MRN: [id number]  Unit/Bed#: -Kristine   Encounter: [id number]  Team: SOD Team A    PATIENT INFORMATION     Name: Ty Stout   Age & Sex: 22 y o  male   MRN: [id number]  Hospital Stay Days: 4    ASSESSMENT/PLAN     Principal Problem:    Calculus of common bile duct with obstruction  Active Problems:    Tobacco abuse    Obesity (BMI 30 0-34  9)    Transaminitis    Hyperbilirubinemia    Microcytosis      Hyperbilirubinemia  Assessment & Plan  Tbili 4 98, jaundice on arrival, trends: 4 32>1 72 today    Plan:  -continue to trend    Transaminitis  Assessment & Plan    on arrival and have trended down to 124/332  T Bili trending down from 4 98 to 1 72  HIV and Hep Panel negative    Plan:  · Likely 2/2 to cholestasis  · Continue to trend    Obesity (BMI 30 0-34  9)  Assessment & Plan  HgbA1C 5 6, Total cholesterol 198, , , HDL 20, TSH 0 98  · Educated on diet and lifestyle modifications    Tobacco abuse  Assessment & Plan  Current smoker, 1 5 ppd  Plan:  -smoking cessation counseling, nicotine patch deferred    * Calculus of common bile duct with obstruction  Assessment & Plan  - 1 month hx of LBP that has become constant in past 1-2 days, now with worsening RUQ/epigastric pain w/ dark urine/jaundice/NBNB emesis  AVSS on arrival  , , Alk-phos 188, Tbili 4 98, WBC 11 13  CT in ED demonstrated stone in gallbladder neck w/o acute cholecystitis, 2 calculi in distal CBD within head of pancreas w/o intrahepatic ductal dilatation   [facility] for likely ERCP, GI eval    - S/p ERCP with sphincterotomy 8/10/2020  - LFTS:   -    -    - Alk Phos 174   - T bili 1 72  - WBC: 14 4    Plan:  · Patient currently NPO  · Lap cholecystectomy scheduled for today   · continue Empiric IV ceftriaxone and flagyl (Day 5)  · trend LFTs and WBC  · Pain regimen ordered oxy 5mg for moderate pain oxy 10mg for severe pain, and dilaudid 1mg for breakthrough pain - has required 2 doses of dilaudid and 1 dose of oxy 10 over 24 hours   · PRN zofran  · Continue fluids - isolyte 100cc      Leukocytosis-resolved as of 2020  Assessment & Plan  Mild leukocytosis on arrival, WBC 11 13 but has trended down to 7 21 today    Plan:  · Continue to trend      Disposition: Continue inpatient stay  Patient scheduled for OR today for lap randy  SUBJECTIVE     Patient seen and examined  No acute events overnight  Patient was sleeping comfortably in bed when approached  Patient denied any complaints this morning  Review of Systems -   General ROS: negative  Respiratory ROS: no cough, shortness of breath, or wheezing  Cardiovascular ROS: no chest pain or dyspnea on exertion  Gastrointestinal ROS: no abdominal pain, change in bowel habits, or black or bloody stools  Musculoskeletal ROS: negative for - joint pain, muscle pain or muscular weakness  Neurological ROS: negative for - dizziness or headaches    OBJECTIVE     Vitals:    08/10/20 1528 08/10/20 1800 08/10/20 2327 20 0709   BP: 120/66  118/65 114/73   BP Location:    Right arm   Pulse: 68 75 70 67   Resp: 16  16 18   Temp: 97 9 °F (36 6 °C)  98 2 °F (36 8 °C) 98 °F (36 7 °C)   TempSrc:    Oral   SpO2: 94% 93% 95% 97%   Weight:       Height:          Temperature:   Temp (24hrs), Av °F (36 7 °C), Min:97 7 °F (36 5 °C), Max:98 3 °F (36 8 °C)    Temperature: 98 °F (36 7 °C)  Intake & Output:  I/O        07 - 08/10 0700 08/10 07 -  0700  07 -  0700    P  O  1200 0     I V  (mL/kg) 2345 (22 3) 1310 (12 5)     IV Piggyback 150      Total Intake(mL/kg) 3695 (35 2) 1310 (12 5)     Urine (mL/kg/hr) 2420 (1) 3820 (1 5)     Emesis/NG output       Total Output 2420 3820     Net +1275 -2510                Weights:   IBW: 77 6 kg    Body mass index is 31 54 kg/m²    Weight (last 2 days)     None        Physical Exam  Constitutional:       Appearance: He is obese  He is not ill-appearing or diaphoretic  HENT:      Head: Normocephalic and atraumatic  Right Ear: External ear normal       Left Ear: External ear normal    Eyes:      Extraocular Movements: Extraocular movements intact  Conjunctiva/sclera: Conjunctivae normal    Cardiovascular:      Rate and Rhythm: Normal rate and regular rhythm  Pulses: Normal pulses  Heart sounds: Normal heart sounds  No murmur  No gallop  Pulmonary:      Effort: Pulmonary effort is normal       Breath sounds: Normal breath sounds  Abdominal:      General: Bowel sounds are normal  There is no distension  Palpations: Abdomen is soft  There is no mass  Tenderness: There is no abdominal tenderness  There is no guarding  Musculoskeletal: Normal range of motion  Skin:     General: Skin is warm and dry  Findings: No rash  Neurological:      Mental Status: He is alert and oriented to person, place, and time  Psychiatric:         Mood and Affect: Mood normal        LABORATORY DATA     Labs: I have personally reviewed pertinent reports  Results from last 7 days   Lab Units 08/11/20  0540 08/10/20  0546 08/09/20  0555  08/07/20 1915   WBC Thousand/uL 14 40* 8 27 7 21   < > 11 13*   HEMOGLOBIN g/dL 16 3 17 1* 15 5   < > 16 9   HEMATOCRIT % 48 7 50 0* 46 1   < > 48 4   PLATELETS Thousands/uL 224 205 177   < > 233   NEUTROS PCT % 80* 59  --   --  77*   MONOS PCT % 5 9  --   --  6    < > = values in this interval not displayed        Results from last 7 days   Lab Units 08/11/20  0540 08/10/20  0547 08/09/20  0555   POTASSIUM mmol/L 4 3 4 4 4 0   CHLORIDE mmol/L 104 103 105   CO2 mmol/L 28 28 24   BUN mg/dL 12 10 8   CREATININE mg/dL 1 05 0 98 0 90   CALCIUM mg/dL 9 1 9 2 9 0   ALK PHOS U/L 174* 188* 171*   ALT U/L 332* 379* 381*   AST U/L 124* 209* 181*              Results from last 7 days   Lab Units 08/07/20 1915   INR  0 96   PTT seconds 27 IMAGING & DIAGNOSTIC TESTING     Radiology Results: I have personally reviewed pertinent reports  Fl Ercp Biliary Only    Result Date: 8/10/2020  Impression: Fluoroscopic guidance for ERCP  Please see procedure report for full details  Workstation performed: [id number]     Ct Renal Stone Study Abdomen Pelvis Wo Contrast    Result Date: 8/7/2020  Impression: 1  Single gallstone in the gallbladder neck without evidence of acute cholecystitis  2  Two calculi in the distal common bile duct within the head of the pancreas measuring 3 and 6 mm  Minimal dilatation of the proximal common bile duct without intrahepatic bile duct dilatation  GI consultation, ERCP and/or MRCP may be helpful for further evaluation  3   No evidence of nephrolithiasis or obstructive uropathy  Workstation performed: [id number]     Other Diagnostic Testing: I have personally reviewed pertinent reports      ACTIVE MEDICATIONS     Current Facility-Administered Medications   Medication Dose Route Frequency    acetaminophen (TYLENOL) tablet 975 mg  975 mg Oral Q6H PRN    ceFAZolin (ANCEF) IVPB (premix) 2,000 mg 50 mL  2,000 mg Intravenous On Call To OR    cefTRIAXone (ROCEPHIN) 2,000 mg in dextrose 5 % 50 mL IVPB  2,000 mg Intravenous Q24H    diphenhydrAMINE (BENADRYL) tablet 12 5 mg  12 5 mg Oral HS PRN    HYDROmorphone (DILAUDID) injection 1 mg  1 mg Intravenous Q3H PRN    melatonin tablet 3 mg  3 mg Oral HS    metroNIDAZOLE (FLAGYL) IVPB (premix) 500 mg 100 mL  500 mg Intravenous Q8H    multi-electrolyte (PLASMALYTE-A/ISOLYTE-S PH 7 4) IV solution  100 mL/hr Intravenous Continuous    ondansetron (ZOFRAN) injection 4 mg  4 mg Intravenous Q6H PRN    oxyCODONE (ROXICODONE) immediate release tablet 10 mg  10 mg Oral Q4H PRN    oxyCODONE (ROXICODONE) IR tablet 5 mg  5 mg Oral Q4H PRN       VTE Pharmacologic Prophylaxis: Sequential compression device (Venodyne)   VTE Mechanical Prophylaxis: sequential compression device    Portions of the record may have been created with voice recognition software  Occasional wrong word or "sound a like" substitutions may have occurred due to the inherent limitations of voice recognition software    Read the chart carefully and recognize, using context, where substitutions have occurred   ==  [facility]  Internal Medicine Residency MS4

## 2020-08-12 VITALS
WEIGHT: 232.59 LBS | OXYGEN SATURATION: 90 % | TEMPERATURE: 97.8 F | HEART RATE: 59 BPM | SYSTOLIC BLOOD PRESSURE: 124 MMHG | DIASTOLIC BLOOD PRESSURE: 66 MMHG | RESPIRATION RATE: 18 BRPM | BODY MASS INDEX: 31.5 KG/M2 | HEIGHT: 72 IN

## 2020-08-12 DIAGNOSIS — K80.50 CHOLEDOCHOLITHIASIS: Primary | ICD-10-CM

## 2020-08-12 LAB
ALBUMIN SERPL BCP-MCNC: 3.5 G/DL (ref 3.5–5)
ALP SERPL-CCNC: 144 U/L (ref 46–116)
ALT SERPL W P-5'-P-CCNC: 267 U/L (ref 12–78)
ANION GAP SERPL CALCULATED.3IONS-SCNC: 8 MMOL/L (ref 4–13)
AST SERPL W P-5'-P-CCNC: 106 U/L (ref 5–45)
BASOPHILS # BLD AUTO: 0.02 THOUSANDS/ΜL (ref 0–0.1)
BASOPHILS NFR BLD AUTO: 0 % (ref 0–1)
BILIRUB SERPL-MCNC: 1.04 MG/DL (ref 0.2–1)
BUN SERPL-MCNC: 12 MG/DL (ref 5–25)
CALCIUM SERPL-MCNC: 9 MG/DL (ref 8.3–10.1)
CHLORIDE SERPL-SCNC: 104 MMOL/L (ref 100–108)
CO2 SERPL-SCNC: 26 MMOL/L (ref 21–32)
CREAT SERPL-MCNC: 0.79 MG/DL (ref 0.6–1.3)
EOSINOPHIL # BLD AUTO: 0.09 THOUSAND/ΜL (ref 0–0.61)
EOSINOPHIL NFR BLD AUTO: 1 % (ref 0–6)
ERYTHROCYTE [DISTWIDTH] IN BLOOD BY AUTOMATED COUNT: 13.2 % (ref 11.6–15.1)
GFR SERPL CREATININE-BSD FRML MDRD: 125 ML/MIN/1.73SQ M
GLUCOSE SERPL-MCNC: 98 MG/DL (ref 65–140)
HCT VFR BLD AUTO: 48.7 % (ref 36.5–49.3)
HGB BLD-MCNC: 15.9 G/DL (ref 12–17)
IMM GRANULOCYTES # BLD AUTO: 0.08 THOUSAND/UL (ref 0–0.2)
IMM GRANULOCYTES NFR BLD AUTO: 1 % (ref 0–2)
LYMPHOCYTES # BLD AUTO: 4.28 THOUSANDS/ΜL (ref 0.6–4.47)
LYMPHOCYTES NFR BLD AUTO: 26 % (ref 14–44)
MCH RBC QN AUTO: 28 PG (ref 26.8–34.3)
MCHC RBC AUTO-ENTMCNC: 32.6 G/DL (ref 31.4–37.4)
MCV RBC AUTO: 86 FL (ref 82–98)
MONOCYTES # BLD AUTO: 1.02 THOUSAND/ΜL (ref 0.17–1.22)
MONOCYTES NFR BLD AUTO: 6 % (ref 4–12)
NEUTROPHILS # BLD AUTO: 10.78 THOUSANDS/ΜL (ref 1.85–7.62)
NEUTS SEG NFR BLD AUTO: 66 % (ref 43–75)
NRBC BLD AUTO-RTO: 0 /100 WBCS
PLATELET # BLD AUTO: 220 THOUSANDS/UL (ref 149–390)
PMV BLD AUTO: 9.4 FL (ref 8.9–12.7)
POTASSIUM SERPL-SCNC: 4.5 MMOL/L (ref 3.5–5.3)
PROT SERPL-MCNC: 6.9 G/DL (ref 6.4–8.2)
RBC # BLD AUTO: 5.68 MILLION/UL (ref 3.88–5.62)
SODIUM SERPL-SCNC: 138 MMOL/L (ref 136–145)
WBC # BLD AUTO: 16.27 THOUSAND/UL (ref 4.31–10.16)

## 2020-08-12 PROCEDURE — NC001 PR NO CHARGE: Performed by: INTERNAL MEDICINE

## 2020-08-12 PROCEDURE — 99238 HOSP IP/OBS DSCHRG MGMT 30/<: CPT | Performed by: INTERNAL MEDICINE

## 2020-08-12 PROCEDURE — 99024 POSTOP FOLLOW-UP VISIT: CPT | Performed by: SURGERY

## 2020-08-12 PROCEDURE — 80053 COMPREHEN METABOLIC PANEL: CPT | Performed by: STUDENT IN AN ORGANIZED HEALTH CARE EDUCATION/TRAINING PROGRAM

## 2020-08-12 PROCEDURE — 85025 COMPLETE CBC W/AUTO DIFF WBC: CPT | Performed by: STUDENT IN AN ORGANIZED HEALTH CARE EDUCATION/TRAINING PROGRAM

## 2020-08-12 RX ADMIN — OXYCODONE HYDROCHLORIDE 10 MG: 10 TABLET ORAL at 04:42

## 2020-08-12 NOTE — PROGRESS NOTES
Progress Note- Selene Tolentino Sites 22 y o  male MRN: [id number]    Unit/Bed#: -01 Encounter: [id number]      Assessment and Plan:     77-year-old with no past medical history presented with abdominal pain, found to have elevated LFTs with CT abdomen showing 2 distal CBD stones and 1 cystic duct stone  Patient underwent ERCP on Monday where sphincterotomy was performed and stones were removed  There was inadvertent  Cannulation of pancreatic duct  Pancreatic duct stent was left in place  1  Choledocholithiasis  Status post ERCP with stone removal   Patient asymptomatic  Total bili decreased to almost normal   Pancreatic duct stent was left in place after inadvertent cannulation of the pancreatic duct  Will order KUB to be done in 10 days to make sure PD stent has fallen out  Patient tolerating diet  2  Cholelithiasis  Status post cholecystectomy  Okay to discharge from GI standpoint  We will sign off  Sarai Donahue MD  Gastroenterology Fellow  [address]  Date: August 12, 2020      ______________________________________________________________________    Subjective:     Patient tolerating diet      Medication Administration - last 24 hours from 08/11/2020 1216 to 08/12/2020 1216       Date/Time Order Dose Route Action Action by     08/11/2020 1230 cefTRIAXone (ROCEPHIN) 2,000 mg in dextrose 5 % 50 mL IVPB   Intravenous SHARIF Alvarez MD     08/11/2020 1230 ondansetron (ZOFRAN) injection 4 mg   Intravenous MAR Unhold Romana Bound, RN     08/11/2020 1230 acetaminophen (TYLENOL) tablet 975 mg   Oral SHARIF Alvarez MD     08/11/2020 2102 melatonin tablet 3 mg 3 mg Oral Given Dallas Hardin RN     08/11/2020 1230 melatonin tablet 3 mg   Oral SHARIF Brown MD     08/11/2020 1829 metroNIDAZOLE (FLAGYL) IVPB (premix) 500 mg 100 mL 500 mg Intravenous New Bag Dallas Hardin RN     08/11/2020 1230 metroNIDAZOLE (FLAGYL) IVPB (premix) 500 mg 100 mL   Intravenous SHARIF Mancilla MD     08/11/2020 1819 multi-electrolyte (PLASMALYTE-A/ISOLYTE-S PH 7 4) IV solution 0 mL/hr Intravenous Stopped Brooke Mendoza, LILI     08/11/2020 1501 multi-electrolyte (PLASMALYTE-A/ISOLYTE-S PH 7 4) IV solution 100 mL/hr Intravenous New Bag Kailee Dk, RN     08/11/2020 1340 multi-electrolyte (PLASMALYTE-A/ISOLYTE-S PH 7 4) IV solution 0 mL/hr Intravenous Stopped Kailee Half, RN     08/11/2020 1230 diphenhydrAMINE (BENADRYL) tablet 12 5 mg   Oral SHARIF Mancilla MD     08/11/2020 1230 oxyCODONE (ROXICODONE) IR tablet 5 mg   Oral SHARIF Kinneyhold Kailee Dk, RN     08/12/2020 0697 oxyCODONE (ROXICODONE) immediate release tablet 10 mg 10 mg Oral Given Shana Mixon, RN     08/11/2020 2056 oxyCODONE (ROXICODONE) immediate release tablet 10 mg 10 mg Oral Given Brooke Mendoza, RN     08/11/2020 1256 oxyCODONE (ROXICODONE) immediate release tablet 10 mg 10 mg Oral Given Kailee Dk, RN     08/11/2020 1230 oxyCODONE (ROXICODONE) immediate release tablet 10 mg   Oral SHARIF Root, RN     08/11/2020 1230 HYDROmorphone (DILAUDID) injection 1 mg   Intravenous SHARIF Mancilla MD          Objective:     Vitals: Blood pressure 124/66, pulse 59, temperature 97 8 °F (36 6 °C), resp  rate 18, height 6' (1 829 m), weight 105 kg (232 lb 9 4 oz), SpO2 90 %  ,Body mass index is 31 54 kg/m²  Intake/Output Summary (Last 24 hours) at 8/12/2020 1216  Last data filed at 8/12/2020 1053  Gross per 24 hour   Intake 840 ml   Output 1650 ml   Net -810 ml       Physical Exam:   General Appearance:   Alert, cooperative, no distress   HEENT:   Normocephalic, atraumatic, anicteric  Neck:  Supple, symmetrical, trachea midline   Lungs:   Clear to auscultation bilaterally; no rales, rhonchi or wheezing; respirations unlabored    Heart[de-identified]   Regular rate and rhythm; no murmur, rub, or gallop     Abdomen:   Soft, non-tender, non-distended; normal bowel sounds; no masses, no organomegaly    Genitalia:   Deferred    Rectal:   Deferred    Extremities:  No cyanosis, clubbing or edema    Pulses:  2+ and symmetric all extremities    Skin:  No jaundice, rashes, or lesions    Lymph nodes:  No palpable cervical lymphadenopathy          Invasive Devices     None                 Lab Results:  Admission on 08/07/2020   Component Date Value    Hemoglobin A1C 08/07/2020 5 6     EAG 08/07/2020 114     Hepatitis B Surface Ag 08/08/2020 Non-reactive     Hepatitis C Ab 08/08/2020 Non-reactive     Hep B C IgM 08/08/2020 Non-reactive     Hep B Core Total Ab 08/08/2020 Non-reactive     Rapid HIV 1 AND 2 08/08/2020 Non-Reactive     HIV-1 P24 Ag Screen 08/08/2020 Non-Reactive     Cholesterol 08/07/2020 198     Triglycerides 08/07/2020 273*    HDL, Direct 08/07/2020 20*    LDL Calculated 08/07/2020 123*    TSH 3RD GENERATON 08/07/2020 0 980     WBC 08/08/2020 10 12     RBC 08/08/2020 5 53     Hemoglobin 08/08/2020 15 7     Hematocrit 08/08/2020 46 7     MCV 08/08/2020 84     MCH 08/08/2020 28 4     MCHC 08/08/2020 33 6     RDW 08/08/2020 12 7     Platelets 85/72/6566 204     MPV 08/08/2020 9 6     Sodium 08/08/2020 140     Potassium 08/08/2020 4 2     Chloride 08/08/2020 106     CO2 08/08/2020 27     ANION GAP 08/08/2020 7     BUN 08/08/2020 10     Creatinine 08/08/2020 0 94     Glucose 08/08/2020 112     Calcium 08/08/2020 8 8     AST 08/08/2020 219*    ALT 08/08/2020 422*    Alkaline Phosphatase 08/08/2020 173*    Total Protein 08/08/2020 6 6     Albumin 08/08/2020 3 5     Total Bilirubin 08/08/2020 4 26*    eGFR 08/08/2020 112     Sodium 08/09/2020 139     Potassium 08/09/2020 4 0     Chloride 08/09/2020 105     CO2 08/09/2020 24     ANION GAP 08/09/2020 10     BUN 08/09/2020 8     Creatinine 08/09/2020 0 90     Glucose 08/09/2020 93     Calcium 08/09/2020 9 0     AST 08/09/2020 181*    ALT 08/09/2020 381*  Alkaline Phosphatase 08/09/2020 171*    Total Protein 08/09/2020 6 6     Albumin 08/09/2020 3 4*    Total Bilirubin 08/09/2020 3 67*    eGFR 08/09/2020 118     WBC 08/09/2020 7 21     RBC 08/09/2020 5 51     Hemoglobin 08/09/2020 15 5     Hematocrit 08/09/2020 46 1     MCV 08/09/2020 84     MCH 08/09/2020 28 1     MCHC 08/09/2020 33 6     RDW 08/09/2020 13 1     Platelets 39/24/4602 177     MPV 08/09/2020 9 2     Lipase 08/09/2020 124     SARS-CoV-2 08/09/2020 Negative     WBC 08/10/2020 8 27     RBC 08/10/2020 5 94*    Hemoglobin 08/10/2020 17 1*    Hematocrit 08/10/2020 50 0*    MCV 08/10/2020 84     MCH 08/10/2020 28 8     MCHC 08/10/2020 34 2     RDW 08/10/2020 13 1     MPV 08/10/2020 9 5     Platelets 95/16/5247 205     nRBC 08/10/2020 0     Neutrophils Relative 08/10/2020 59     Immat GRANS % 08/10/2020 0     Lymphocytes Relative 08/10/2020 29     Monocytes Relative 08/10/2020 9     Eosinophils Relative 08/10/2020 2     Basophils Relative 08/10/2020 1     Neutrophils Absolute 08/10/2020 4 90     Immature Grans Absolute 08/10/2020 0 03     Lymphocytes Absolute 08/10/2020 2 41     Monocytes Absolute 08/10/2020 0 71     Eosinophils Absolute 08/10/2020 0 18     Basophils Absolute 08/10/2020 0 04     Sodium 08/10/2020 139     Potassium 08/10/2020 4 4     Chloride 08/10/2020 103     CO2 08/10/2020 28     ANION GAP 08/10/2020 8     BUN 08/10/2020 10     Creatinine 08/10/2020 0 98     Glucose 08/10/2020 85     Calcium 08/10/2020 9 2     AST 08/10/2020 209*    ALT 08/10/2020 379*    Alkaline Phosphatase 08/10/2020 188*    Total Protein 08/10/2020 7 3     Albumin 08/10/2020 3 8     Total Bilirubin 08/10/2020 4 32*    eGFR 08/10/2020 107     WBC 08/11/2020 14 40*    RBC 08/11/2020 5 80*    Hemoglobin 08/11/2020 16 3     Hematocrit 08/11/2020 48 7     MCV 08/11/2020 84     MCH 08/11/2020 28 1     MCHC 08/11/2020 33 5     RDW 08/11/2020 13 1     MPV 08/11/2020 9 3     Platelets 10/98/0947 224     nRBC 08/11/2020 0     Neutrophils Relative 08/11/2020 80*    Immat GRANS % 08/11/2020 1     Lymphocytes Relative 08/11/2020 14     Monocytes Relative 08/11/2020 5     Eosinophils Relative 08/11/2020 0     Basophils Relative 08/11/2020 0     Neutrophils Absolute 08/11/2020 11 58*    Immature Grans Absolute 08/11/2020 0 07     Lymphocytes Absolute 08/11/2020 2 00     Monocytes Absolute 08/11/2020 0 71     Eosinophils Absolute 08/11/2020 0 02     Basophils Absolute 08/11/2020 0 02     Sodium 08/11/2020 139     Potassium 08/11/2020 4 3     Chloride 08/11/2020 104     CO2 08/11/2020 28     ANION GAP 08/11/2020 7     BUN 08/11/2020 12     Creatinine 08/11/2020 1 05     Glucose 08/11/2020 109     Calcium 08/11/2020 9 1     AST 08/11/2020 124*    ALT 08/11/2020 332*    Alkaline Phosphatase 08/11/2020 174*    Total Protein 08/11/2020 7 7     Albumin 08/11/2020 3 9     Total Bilirubin 08/11/2020 1 72*    eGFR 08/11/2020 98     ABO Grouping 08/11/2020 A     Rh Factor 08/11/2020 Positive     Antibody Screen 08/11/2020 Negative     Specimen Expiration Date 08/11/2020 96198550     WBC 08/12/2020 16 27*    RBC 08/12/2020 5 68*    Hemoglobin 08/12/2020 15 9     Hematocrit 08/12/2020 48 7     MCV 08/12/2020 86     MCH 08/12/2020 28 0     MCHC 08/12/2020 32 6     RDW 08/12/2020 13 2     MPV 08/12/2020 9 4     Platelets 41/84/3460 220     nRBC 08/12/2020 0     Neutrophils Relative 08/12/2020 66     Immat GRANS % 08/12/2020 1     Lymphocytes Relative 08/12/2020 26     Monocytes Relative 08/12/2020 6     Eosinophils Relative 08/12/2020 1     Basophils Relative 08/12/2020 0     Neutrophils Absolute 08/12/2020 10 78*    Immature Grans Absolute 08/12/2020 0 08     Lymphocytes Absolute 08/12/2020 4 28     Monocytes Absolute 08/12/2020 1 02     Eosinophils Absolute 08/12/2020 0 09     Basophils Absolute 08/12/2020 0 02     Sodium 08/12/2020 138     Potassium 08/12/2020 4 5     Chloride 08/12/2020 104     CO2 08/12/2020 26     ANION GAP 08/12/2020 8     BUN 08/12/2020 12     Creatinine 08/12/2020 0 79     Glucose 08/12/2020 98     Calcium 08/12/2020 9 0     AST 08/12/2020 106*    ALT 08/12/2020 267*    Alkaline Phosphatase 08/12/2020 144*    Total Protein 08/12/2020 6 9     Albumin 08/12/2020 3 5     Total Bilirubin 08/12/2020 1 04*    eGFR 08/12/2020 125        Imaging Studies: I have personally reviewed pertinent imaging studies

## 2020-08-12 NOTE — DISCHARGE SUMMARY
INTERNAL MEDICINE RESIDENCY DISCHARGE SUMMARY     Janae Lloyd Sites   22 y o  male  MRN: [id number]  Room/Bed: /MS [address]    Encounter: [id number]    Principal Problem:    Calculus of common bile duct with obstruction  Active Problems:    Tobacco abuse    Obesity (BMI 30 0-34  9)    Transaminitis    Hyperbilirubinemia    Microcytosis      Hyperbilirubinemia  Assessment & Plan  Tbili 4 98, jaundice on arrival, trends: 1 72>1 04 today    Plan:  -continue to trend    Transaminitis  Assessment & Plan    on arrival and have trended down to 106/267  T Bili trending down from 4 98 to 1 04  HIV and Hep Panel negative    Plan:  · Likely 2/2 to cholestasis  · Continue to trend    Obesity (BMI 30 0-34  9)  Assessment & Plan  HgbA1C 5 6, Total cholesterol 198, , , HDL 20, TSH 0 98  · Educated on diet and lifestyle modifications    Tobacco abuse  Assessment & Plan  Current smoker, 1 5 ppd  Plan:  -smoking cessation counseling, nicotine patch deferred    * Calculus of common bile duct with obstruction  Assessment & Plan  - 1 month hx of LBP that has become constant in past 1-2 days, now with worsening RUQ/epigastric pain w/ dark urine/jaundice/NBNB emesis  AVSS on arrival  , , Alk-phos 188, Tbili 4 98, WBC 11 13  CT in ED demonstrated stone in gallbladder neck w/o acute cholecystitis, 2 calculi in distal CBD within head of pancreas w/o intrahepatic ductal dilatation  Transfer [facility] ERCP, GI eval    - S/p ERCP with sphincterotomy 8/10/2020  - POD1 s/p lap randy  - LFTS:   -    -    - Alk Phos 144   - T bili 1 04  - WBC: 16 27    Plan:  · KUB in 7-10 days to monitor pancreatic stent   If stent does not pass, patient will need EGD  · trend LFTs and WBC  · Pain regimen ordered: 975 mg Tylenol for mild pain, oxy 5mg for moderate pain oxy 10mg for severe pain  · PRN zofran  · Patient okay for discharge today      Leukocytosis-resolved as of 8/9/2020  Assessment & Plan  Mild leukocytosis on arrival, WBC 11 13 but has trended down to 7 21 today    Plan:  · Continue to trend      [facility] is w 22year old male with no significant past medical history that initially presented to [facility] with one month of lower back pain  Two days prior to arrival, he noticed worsening back and epigastric pain along with dark urine  He endorsed nausea and NBNB vomiting with decreased PO intake  In the ED, patient was afebrile, borderline tachycardic  Blood cultures were collected and he was started on IV Ceftriaxone  LFTS were elevated: A , , Alk Phos 188, T Bili 4 98  Patient had mild leukocytosis of 11 13  CT showed a single gallstone in the gallbladder neck without evidence of acute cholecystitis, 2 calculi distal to the CBD measuring 3 mm and 6mm  There was minimal dilatation of the proximal CBD without intrahepatic bile duct dilatation  Patient was transferred to [facility] for GI evaluation  On 8/10, patient underwent ERCP with sphincterotomy  IV antibiotics were discontinued on 8/11  On 8/11, patient underwent lap randy  Patient tolerated both procedures well, with adequate pain control and PO intake  Patient is stable for discharge  Patient does not have a PCP  Discussed with patient to contact [employer] and [employer] to make a follow up appointment  Patient will also need to make follow up appointments with General Surgery and get a KUB to make sure his pancreatic stent has passed  Discussed with patient to get labs before he goes to his follow up appointments  Also provided patient with a work note          DISCHARGE INFORMATION     PCP at Discharge: Alexx Barboza MD    Admitting Provider: Edita Najera DO  Admission Date: 8/7/2020    Discharge Provider: No att  providers found  Discharge Date: 8/12/2020    Discharge Disposition: Home/Self Care  Discharge Condition: good  Discharge with Lines: no    Discharge Diet: regular diet  Activity Restrictions: avoid heavy lifting for a few days  Test Results Pending at Discharge: none    Discharge Diagnoses:  Principal Problem:    Calculus of common bile duct with obstruction  Active Problems:    Tobacco abuse    Obesity (BMI 30 0-34  9)    Transaminitis    Hyperbilirubinemia    Microcytosis  Resolved Problems:    Leukocytosis      Consulting Providers:  General Surgery - Dr Santiago Wesley - Dr Vida Dillon Procedures Performed: Fl Ercp Biliary Only    Result Date: 8/10/2020  Impression: Fluoroscopic guidance for ERCP  Please see procedure report for full details  Workstation performed: [id number]     Ct Renal Stone Study Abdomen Pelvis Wo Contrast    Result Date: 8/7/2020  Impression: 1  Single gallstone in the gallbladder neck without evidence of acute cholecystitis  2  Two calculi in the distal common bile duct within the head of the pancreas measuring 3 and 6 mm  Minimal dilatation of the proximal common bile duct without intrahepatic bile duct dilatation  GI consultation, ERCP and/or MRCP may be helpful for further evaluation  3   No evidence of nephrolithiasis or obstructive uropathy  Workstation performed: [id number]       Code Status: Level 1 - Full Code  Advance Directive & Living Will: <no information>  Power of :    POLST:      Medications: There are no discharge medications for this patient  Allergies:  No Known Allergies    FOLLOW-UP     PCP Outpatient Follow-up:  Patient does not have a PCP  - Advised patient to contact [employer] and [facility] to make follow up appointment    Follow up with consulting providers  General Surgery  - Follow up for post-op    Active Issues Requiring Follow-up   Imaging  - Patient needs KUB in 7-10 days to make sure pancreatic stent has passed    Discharge Statement:   I spent 30 minutes minutes discharging the patient   This time was spent on the day of discharge  I had direct contact with the patient on the day of discharge  Additional documentation is required if more than 30 minutes were spent on discharge  Portions of the record may have been created with voice recognition software  Occasional wrong word or "sound a like" substitutions may have occurred due to the inherent limitations of voice recognition software    Read the chart carefully and recognize, using context, where substitutions have occurred     ==  [facility]  Internal Medicine Resident [id number]

## 2020-08-12 NOTE — PROGRESS NOTES
INTERNAL MEDICINE RESIDENCY PROGRESS NOTE     Name: [address] [place] [address]   Age & Sex: 22 y o  male   MRN: [id number]  Unit/Bed#: -Kristine   Encounter: [id number]  Team: SOD Team A    PATIENT INFORMATION     Name: [address] [place] [address]   Age & Sex: 22 y o  male   MRN: [id number]  Hospital Stay Days: 5    ASSESSMENT/PLAN     Principal Problem:    Calculus of common bile duct with obstruction  Active Problems:    Tobacco abuse    Obesity (BMI 30 0-34  9)    Transaminitis    Hyperbilirubinemia    Microcytosis      Hyperbilirubinemia  Assessment & Plan  Tbili 4 98, jaundice on arrival, trends: 1 72>1 04 today    Plan:  -continue to trend    Transaminitis  Assessment & Plan    on arrival and have trended down to 106/267  T Bili trending down from 4 98 to 1 04  HIV and Hep Panel negative    Plan:  · Likely 2/2 to cholestasis  · Continue to trend    Obesity (BMI 30 0-34  9)  Assessment & Plan  HgbA1C 5 6, Total cholesterol 198, , , HDL 20, TSH 0 98  · Educated on diet and lifestyle modifications    Tobacco abuse  Assessment & Plan  Current smoker, 1 5 ppd  Plan:  -smoking cessation counseling, nicotine patch deferred    * Calculus of common bile duct with obstruction  Assessment & Plan  - 1 month hx of LBP that has become constant in past 1-2 days, now with worsening RUQ/epigastric pain w/ dark urine/jaundice/NBNB emesis  AVSS on arrival  , , Alk-phos 188, Tbili 4 98, WBC 11 13  CT in ED demonstrated stone in gallbladder neck w/o acute cholecystitis, 2 calculi in distal CBD within head of pancreas w/o intrahepatic ductal dilatation  [facility] for likely ERCP, GI eval    - S/p ERCP with sphincterotomy 8/10/2020  - POD1 s/p lap randy  - LFTS:   -    -    - Alk Phos 144   - T bili 1 04  - WBC: 16 27    Plan:  · KUB in 7-10 days to monitor pancreatic stent   If stent does not pass, patient will need EGD  · trend LFTs and WBC  · Pain regimen ordered: 975 mg Tylenol for mild pain, oxy 5mg for moderate pain oxy 10mg for severe pain  · PRN zofran  · Patient okay for discharge today      Leukocytosis-resolved as of 2020  Assessment & Plan  Mild leukocytosis on arrival, WBC 11 13 but has trended down to 7 21 today    Plan:  · Continue to trend      Disposition: Discharge home today     SUBJECTIVE     Patient seen and examined  No acute events overnight  Patient lying comfortably in bed when approached  Patient states that he is tolerating food and his pain is well controlled at this time  Patient has been out of bed and walking since his lap randy yesterday  He has not had a bowel movement yet, but he is passing gas  Review of Systems -   General ROS: negative for - chills, fever or sleep disturbance  Respiratory ROS: negative for - shortness of breath  Cardiovascular ROS: negative for chest tightness, chest pain  Gastrointestinal ROS: positive for - abdominal pain  Genito-Urinary ROS: negative for - change in urinary stream or dysuria  Neurological ROS: negative for - dizziness, gait disturbance or headaches    OBJECTIVE     Vitals:    20 1202 20 1504 20 2300 20 0715   BP:  132/84 124/65 124/66   BP Location:       Pulse:  64 66 59   Resp:  20 19 18   Temp:  97 8 °F (36 6 °C) 98 3 °F (36 8 °C) 97 8 °F (36 6 °C)   TempSrc:       SpO2: 96% 94% 95% 90%   Weight:       Height:          Temperature:   Temp (24hrs), Av 9 °F (36 6 °C), Min:97 6 °F (36 4 °C), Max:98 3 °F (36 8 °C)    Temperature: 97 8 °F (36 6 °C)  Intake & Output:  I/O       08/10 07 -  0700  07 -  0700  07 -  0700    P  O  0 0     I V  (mL/kg) 1310 (12 5) 2236 7 (21 3)     IV Piggyback       Total Intake(mL/kg) 1310 (12 5) 2236 7 (21 3)     Urine (mL/kg/hr) 3820 (1 5) 1100 (0 4)     Total Output 3820 1100     Net -2510 +1136 7                Weights:   IBW: 77 6 kg    Body mass index is 31 54 kg/m²    Weight (last 2 days)     None        Physical Exam  Constitutional:       General: He is not in acute distress  Appearance: He is obese  He is not diaphoretic  HENT:      Head: Normocephalic and atraumatic  Right Ear: External ear normal       Left Ear: External ear normal    Eyes:      Extraocular Movements: Extraocular movements intact  Conjunctiva/sclera: Conjunctivae normal    Neck:      Musculoskeletal: Normal range of motion and neck supple  Cardiovascular:      Rate and Rhythm: Normal rate and regular rhythm  Heart sounds: No murmur  No gallop  Pulmonary:      Effort: Pulmonary effort is normal  No respiratory distress  Abdominal:      General: Bowel sounds are normal  There is no distension  Palpations: Abdomen is soft  There is no mass  Tenderness: There is abdominal tenderness (tenderness to light palpation in RUQ)  There is no guarding  Musculoskeletal: Normal range of motion  Right lower leg: No edema  Left lower leg: No edema  Skin:     General: Skin is warm and dry  Neurological:      Mental Status: He is alert and oriented to person, place, and time  Psychiatric:         Mood and Affect: Mood normal          Behavior: Behavior normal        LABORATORY DATA     Labs: I have personally reviewed pertinent reports    Results from last 7 days   Lab Units 08/12/20  0438 08/11/20  0540 08/10/20  0546   WBC Thousand/uL 16 27* 14 40* 8 27   HEMOGLOBIN g/dL 15 9 16 3 17 1*   HEMATOCRIT % 48 7 48 7 50 0*   PLATELETS Thousands/uL 220 224 205   NEUTROS PCT % 66 80* 59   MONOS PCT % 6 5 9      Results from last 7 days   Lab Units 08/12/20  0439 08/11/20  0540 08/10/20  0547   POTASSIUM mmol/L 4 5 4 3 4 4   CHLORIDE mmol/L 104 104 103   CO2 mmol/L 26 28 28   BUN mg/dL 12 12 10   CREATININE mg/dL 0 79 1 05 0 98   CALCIUM mg/dL 9 0 9 1 9 2   ALK PHOS U/L 144* 174* 188*   ALT U/L 267* 332* 379*   AST U/L 106* 124* 209*              Results from last 7 days   Lab Units 08/07/20  1915   INR  0 96   PTT seconds [address] [place] [address]      Radiology Results: I have personally reviewed pertinent reports  Fl Ercp Biliary Only    Result Date: 8/10/2020  Impression: Fluoroscopic guidance for ERCP  Please see procedure report for full details  Workstation performed: [id number]     Ct Renal Stone Study Abdomen Pelvis Wo Contrast    Result Date: 8/7/2020  Impression: 1  Single gallstone in the gallbladder neck without evidence of acute cholecystitis  2  Two calculi in the distal common bile duct within the head of the pancreas measuring 3 and 6 mm  Minimal dilatation of the proximal common bile duct without intrahepatic bile duct dilatation  GI consultation, ERCP and/or MRCP may be helpful for further evaluation  3   No evidence of nephrolithiasis or obstructive uropathy  Workstation performed: [id number]     Other Diagnostic Testing: I have personally reviewed pertinent reports  ACTIVE MEDICATIONS     Current Facility-Administered Medications   Medication Dose Route Frequency    acetaminophen (TYLENOL) tablet 975 mg  975 mg Oral Q6H PRN    diphenhydrAMINE (BENADRYL) tablet 12 5 mg  12 5 mg Oral HS PRN    melatonin tablet 3 mg  3 mg Oral HS    ondansetron (ZOFRAN) injection 4 mg  4 mg Intravenous Q6H PRN    oxyCODONE (ROXICODONE) immediate release tablet 10 mg  10 mg Oral Q4H PRN    oxyCODONE (ROXICODONE) IR tablet 5 mg  5 mg Oral Q4H PRN       VTE Pharmacologic Prophylaxis: Sequential compression device (Venodyne)   VTE Mechanical Prophylaxis: sequential compression device    Portions of the record may have been created with voice recognition software  Occasional wrong word or "sound a like" substitutions may have occurred due to the inherent limitations of voice recognition software    Read the chart carefully and recognize, using context, where substitutions have occurred   ==  [address] [facility]  Internal Medicine Residency MS4

## 2020-08-12 NOTE — PROGRESS NOTES
Progress Note - General Surgery   Reagan Sites 22 y o  male MRN: [id number]  Unit/Bed#: -01 Encounter: [id number]    Assessment:  22 M with choledocholithiasis s/p ERCP w/ sphincterotomy (8/10) and now s/p lap cholecystectomy (8/11)    Plan:  · Anticipating discharge home today - f/u in 2 weeks  · WBC up this AM, likely reactive from surgery  TBili downtrending  · Diet as tolerated  · Pain control  · DVT ppx  · Care per primary    Subjective/Objective     Subjective:   No acute events  Doing well this AM  Pain well controlled  Tolerating diet  Voiding  Objective:    Blood pressure 124/65, pulse 66, temperature 98 3 °F (36 8 °C), resp  rate 19, height 6' (1 829 m), weight 105 kg (232 lb 9 4 oz), SpO2 95 %  ,Body mass index is 31 54 kg/m²  Intake/Output Summary (Last 24 hours) at 8/12/2020 0600  Last data filed at 8/12/2020 0446  Gross per 24 hour   Intake 2236 67 ml   Output 1100 ml   Net 1136 67 ml       Invasive Devices     Peripheral Intravenous Line            Peripheral IV 08/11/20 Right Antecubital less than 1 day                Physical Exam:   GEN: NAD  HEENT: MMM  CV: Warm/well perfused  Lung: normal effort  Ab: Soft, NT/ND  Extrem: No CCE  Neuro:  A+Ox3, motor and sensation grossly intact      Results from last 7 days   Lab Units 08/12/20  0438 08/11/20  0540 08/10/20  0546   WBC Thousand/uL 16 27* 14 40* 8 27   HEMOGLOBIN g/dL 15 9 16 3 17 1*   HEMATOCRIT % 48 7 48 7 50 0*   PLATELETS Thousands/uL 220 224 205       Results from last 7 days   Lab Units 08/07/20  1915   INR  0 96   PTT seconds 27      Lab Results   Component Value Date    SODIUM 138 08/12/2020    K 4 5 08/12/2020     08/12/2020    CO2 26 08/12/2020    AGAP 8 08/12/2020    BUN 12 08/12/2020    CREATININE 0 79 08/12/2020    GLUC 98 08/12/2020    CALCIUM 9 0 08/12/2020     (H) 08/12/2020     (H) 08/12/2020    ALKPHOS 144 (H) 08/12/2020    TP 6 9 08/12/2020    TBILI 1 04 (H) 08/12/2020    EGFR 125 08/12/2020 I have personally reviewed pertinent films in PACS

## 2020-08-12 NOTE — PLAN OF CARE
Problem: INFECTION - ADULT  Goal: Absence or prevention of progression during hospitalization  Description: INTERVENTIONS:  - Assess and monitor for signs and symptoms of infection  - Monitor lab/diagnostic results  - Monitor all insertion sites, i e  indwelling lines, tubes, and drains  - Monitor endotracheal if appropriate and nasal secretions for changes in amount and color  - [place] appropriate cooling/warming therapies per order  - Administer medications as ordered  - Instruct and encourage patient and family to use good hand hygiene technique  - Identify and instruct in appropriate isolation precautions for identified infection/condition  Outcome: Adequate for Discharge  Goal: Absence of fever/infection during neutropenic period  Description: INTERVENTIONS:  - Monitor WBC    Outcome: Adequate for Discharge     Problem: METABOLIC, FLUID AND ELECTROLYTES - ADULT  Goal: Electrolytes maintained within normal limits  Description: INTERVENTIONS:  - Monitor labs and assess patient for signs and symptoms of electrolyte imbalances  - Administer electrolyte replacement as ordered  - Monitor response to electrolyte replacements, including repeat lab results as appropriate  - Instruct patient on fluid and nutrition as appropriate  Outcome: Adequate for Discharge  Goal: Fluid balance maintained  Description: INTERVENTIONS:  - Monitor labs   - Monitor I/O and WT  - Instruct patient on fluid and nutrition as appropriate  - Assess for signs & symptoms of volume excess or deficit  Outcome: Adequate for Discharge  Goal: Glucose maintained within target range  Description: INTERVENTIONS:  - Monitor Blood Glucose as ordered  - Assess for signs and symptoms of hyperglycemia and hypoglycemia  - Administer ordered medications to maintain glucose within target range  - Assess nutritional intake and initiate nutrition service referral as needed  Outcome: Adequate for Discharge

## 2020-08-13 LAB
BACTERIA BLD CULT: NORMAL
BACTERIA BLD CULT: NORMAL

## 2020-08-13 NOTE — UTILIZATION REVIEW
Notification of Discharge  This is a Notification of Discharge from our facility [address]  Please be advised that this patient has been discharge from our facility  Below you will find the admission and discharge date and time including the patients disposition  PRESENTATION DATE: 8/7/2020 10:16 PM  OBS ADMISSION DATE:   IP ADMISSION DATE: 8/7/20 2216   DISCHARGE DATE: 8/12/2020 12:26 PM  DISPOSITION: Home/Self Care Home/Self Care   Admission Orders listed below:  Admission Orders (From admission, onward)     Ordered        08/07/20 2235  Inpatient Admission  Once                   Please contact the UR Department if additional information is required to close this patient's authorization/case  [facility] Utilization Review Department  Main: [phone] x carefully listen to the prompts  All voicemails are confidential   [email or web address]  Send all requests for admission clinical reviews, approved or denied determinations and any other requests to dedicated fax number below belonging to the campus where the patient is receiving treatment   List of dedicated fax numbers:  [address] DENIALS (Administrative/Medical Necessity) [phone]   [address] (Maternity/NICU/Pediatrics) [phone]   Erik Jacobo [phone]   [facility] [phone]   [address] [place] [address] [phone]   [address] [phone]   [facility]  [phone]   [address] [facility]  [address] [address] [address] [facility] [phone]

## 2021-03-25 ENCOUNTER — TELEPHONE (OUTPATIENT)
Dept: GASTROENTEROLOGY | Facility: AMBULARY SURGERY CENTER | Age: 26
End: 2021-03-25

## 2021-03-25 NOTE — TELEPHONE ENCOUNTER
Called Donato back  She is questioning authorizations for procedure done while patient was inpatient  I am unable to answer any questions or assist her as I am unable to see any billing for hospital charges  Gave her the number to the billing department to discuss

## 2021-03-25 NOTE — TELEPHONE ENCOUNTER
Patients GI provider:  Dr Meka Arias     Number to return call: [phone]     Reason for call: Shani Holland from [employer] called requesting to speak with someone to discuss procedure auth       Scheduled procedure/appointment date if applicable: Apt/procedure

## (undated) DEVICE — TROCAR: Brand: KII® SLEEVE

## (undated) DEVICE — ELECTRODE LAP J HOOK SPLIT STEM E-Z CLEAN 33CM -0021S

## (undated) DEVICE — TROCAR: Brand: KII FIOS FIRST ENTRY

## (undated) DEVICE — GLOVE INDICATOR PI UNDERGLOVE SZ 7 BLUE

## (undated) DEVICE — SUT VICRYL 0 UR-6 27 IN J603H

## (undated) DEVICE — ENDOPATH 5MM CURVED SCISSORS WITH MONOPOLAR CAUTERY: Brand: ENDOPATH

## (undated) DEVICE — GLOVE PI ULTRA TOUCH SZ.6.5

## (undated) DEVICE — ADHESIVE SKIN HIGH VISCOSITY EXOFIN 1ML

## (undated) DEVICE — ENDOPATH PNEUMONEEDLE INSUFFLATION NEEDLES WITH LUER LOCK CONNECTORS 120MM: Brand: ENDOPATH

## (undated) DEVICE — CHLORAPREP HI-LITE 26ML ORANGE

## (undated) DEVICE — PENCIL ELECTROSURG E-Z CLEAN -0035H

## (undated) DEVICE — 3000CC GUARDIAN II: Brand: GUARDIAN

## (undated) DEVICE — LIGAMAX 5 MM ENDOSCOPIC MULTIPLE CLIP APPLIER: Brand: LIGAMAX

## (undated) DEVICE — TISSUE RETRIEVAL SYSTEM: Brand: INZII RETRIEVAL SYSTEM

## (undated) DEVICE — SUT MONOCRYL 4-0 PS-2 18 IN Y496G

## (undated) DEVICE — NEEDLE 25G X 1 1/2

## (undated) DEVICE — Device: Brand: OMNICLOSE TROCAR SITE CLOSURE DEVICE

## (undated) DEVICE — PACK PBDS LAP CHOLE RF

## (undated) DEVICE — INTENDED FOR TISSUE SEPARATION, AND OTHER PROCEDURES THAT REQUIRE A SHARP SURGICAL BLADE TO PUNCTURE OR CUT.: Brand: BARD-PARKER SAFETY BLADES SIZE 11, STERILE